# Patient Record
Sex: MALE | Race: WHITE | NOT HISPANIC OR LATINO | Employment: OTHER | URBAN - METROPOLITAN AREA
[De-identification: names, ages, dates, MRNs, and addresses within clinical notes are randomized per-mention and may not be internally consistent; named-entity substitution may affect disease eponyms.]

---

## 2018-01-29 ENCOUNTER — TRANSFERRED RECORDS (OUTPATIENT)
Dept: HEALTH INFORMATION MANAGEMENT | Facility: CLINIC | Age: 53
End: 2018-01-29

## 2018-02-05 ENCOUNTER — TRANSFERRED RECORDS (OUTPATIENT)
Dept: HEALTH INFORMATION MANAGEMENT | Facility: CLINIC | Age: 53
End: 2018-02-05

## 2019-02-14 ENCOUNTER — TRANSFERRED RECORDS (OUTPATIENT)
Dept: HEALTH INFORMATION MANAGEMENT | Facility: CLINIC | Age: 54
End: 2019-02-14

## 2019-02-14 LAB
ALBUMIN SERPL-MCNC: ABNORMAL G/DL
ALP SERPL-CCNC: 59 U/L (ref 30–120)
ALT SERPL-CCNC: 62 U/L (ref 0–45)
ANION GAP SERPL CALCULATED.3IONS-SCNC: ABNORMAL MMOL/L
AST SERPL-CCNC: 35 U/L (ref 0–41)
BILIRUB SERPL-MCNC: ABNORMAL MG/DL
BUN SERPL-MCNC: 19 MG/DL (ref 6–25)
CALCIUM SERPL-MCNC: ABNORMAL MG/DL
CHLORIDE SERPLBLD-SCNC: ABNORMAL MMOL/L
CHOLEST SERPL-MCNC: 261 MG/DL (ref 120–240)
CO2 SERPL-SCNC: ABNORMAL MMOL/L
CREAT SERPL-MCNC: 0.85 MG/DL (ref 0.6–1.5)
GFR SERPL CREATININE-BSD FRML MDRD: ABNORMAL ML/MIN/1.73M2
GLUCOSE SERPL-MCNC: 85 MG/DL (ref 70–110)
HDLC SERPL-MCNC: 48.9 MG/DL
LDLC SERPL CALC-MCNC: 177 MG/DL
NONHDLC SERPL-MCNC: ABNORMAL MG/DL
POTASSIUM SERPL-SCNC: ABNORMAL MMOL/L
PROT SERPL-MCNC: ABNORMAL G/DL
SODIUM SERPL-SCNC: ABNORMAL MMOL/L
TRIGL SERPL-MCNC: 173 MG/DL (ref 10–200)

## 2019-04-25 ENCOUNTER — PRE VISIT (OUTPATIENT)
Dept: CARDIOLOGY | Facility: CLINIC | Age: 54
End: 2019-04-25

## 2019-04-25 NOTE — TELEPHONE ENCOUNTER
"Per [facility] and Care Everywhere, has not been seen other than by orthopedics since 12/2016.   Voice message left for patient to call back, to let us know if he has been seen by any other providers.    Patient called right back. Has not seen any providers since 2016. Stopped taking the Atorvastatin \"a while ago\". Just did not like it. Was told there might be a different drug, but never followed up.  Denies any cardiac complaints. Need for life insurance.   When asked why no follow up, stated last MD told me I was \"good to go\", thought that ment I did not have to return.   Encouraged to come to appointment fasting, so labs can be done once seen by Dr. Burnett. Stress the importance of follow up and medication compliance post stenting.   Verbalzied understanding.  "

## 2019-04-30 ENCOUNTER — TELEPHONE (OUTPATIENT)
Dept: CARDIOLOGY | Facility: CLINIC | Age: 54
End: 2019-04-30

## 2019-04-30 NOTE — TELEPHONE ENCOUNTER
Needing Tax ID for clinic for insurance. Wanting to make sure it is covered by his insurance policy at [facility].   Will cancel appointment if not covered by insurance.

## 2019-05-01 ENCOUNTER — OFFICE VISIT (OUTPATIENT)
Dept: CARDIOLOGY | Facility: CLINIC | Age: 54
End: 2019-05-01
Payer: COMMERCIAL

## 2019-05-01 ENCOUNTER — DOCUMENTATION ONLY (OUTPATIENT)
Dept: CARDIOLOGY | Facility: CLINIC | Age: 54
End: 2019-05-01

## 2019-05-01 ENCOUNTER — HOSPITAL ENCOUNTER (OUTPATIENT)
Dept: CARDIOLOGY | Facility: CLINIC | Age: 54
Discharge: HOME | End: 2019-05-01
Attending: INTERNAL MEDICINE | Admitting: INTERNAL MEDICINE
Payer: COMMERCIAL

## 2019-05-01 ENCOUNTER — TELEPHONE (OUTPATIENT)
Dept: CARDIOLOGY | Facility: CLINIC | Age: 54
End: 2019-05-01

## 2019-05-01 VITALS
BODY MASS INDEX: 33.64 KG/M2 | HEIGHT: 69 IN | SYSTOLIC BLOOD PRESSURE: 101 MMHG | WEIGHT: 227.1 LBS | HEART RATE: 56 BPM | DIASTOLIC BLOOD PRESSURE: 68 MMHG

## 2019-05-01 DIAGNOSIS — I25.10 CORONARY ARTERY DISEASE INVOLVING NATIVE CORONARY ARTERY OF NATIVE HEART WITHOUT ANGINA PECTORIS: ICD-10-CM

## 2019-05-01 DIAGNOSIS — E78.5 HYPERLIPIDEMIA LDL GOAL <70: Primary | ICD-10-CM

## 2019-05-01 DIAGNOSIS — E78.5 HYPERLIPIDEMIA LDL GOAL <70: ICD-10-CM

## 2019-05-01 DIAGNOSIS — I25.10 CAD (CORONARY ARTERY DISEASE): Primary | ICD-10-CM

## 2019-05-01 PROCEDURE — 93000 ELECTROCARDIOGRAM COMPLETE: CPT | Mod: 59 | Performed by: INTERNAL MEDICINE

## 2019-05-01 PROCEDURE — 99213 OFFICE O/P EST LOW 20 MIN: CPT | Mod: 25 | Performed by: INTERNAL MEDICINE

## 2019-05-01 PROCEDURE — 40000264 ECHOCARDIOGRAM COMPLETE

## 2019-05-01 PROCEDURE — 25500064 ZZH RX 255 OP 636: Performed by: INTERNAL MEDICINE

## 2019-05-01 PROCEDURE — 93306 TTE W/DOPPLER COMPLETE: CPT | Mod: 26 | Performed by: INTERNAL MEDICINE

## 2019-05-01 RX ORDER — ATORVASTATIN CALCIUM 40 MG/1
40 TABLET, FILM COATED ORAL DAILY
Qty: 90 TABLET | Refills: 3 | Status: SHIPPED | OUTPATIENT
Start: 2019-05-01 | End: 2019-08-08

## 2019-05-01 RX ORDER — ATORVASTATIN CALCIUM 10 MG/1
40 TABLET, FILM COATED ORAL DAILY
Qty: 90 TABLET | Refills: 3 | Status: SHIPPED | OUTPATIENT
Start: 2019-05-01 | End: 2019-05-01 | Stop reason: ALTCHOICE

## 2019-05-01 RX ADMIN — HUMAN ALBUMIN MICROSPHERES AND PERFLUTREN 6 ML: 10; .22 INJECTION, SOLUTION INTRAVENOUS at 13:30

## 2019-05-01 ASSESSMENT — MIFFLIN-ST. JEOR: SCORE: 1855.5

## 2019-05-01 NOTE — TELEPHONE ENCOUNTER
Interpretation Summary Echo results      1. Normal left ventricular size and systolic function. LVEF 55-60%.  2. No regional wall motion abnormalities.  3. Normal right ventricular size and systolic function.  4. No hemodynamically significant valve disease.     Compared to the prior study dated 6/24/2015, there have been no changes.  __________________________________________________________________________

## 2019-05-01 NOTE — TELEPHONE ENCOUNTER
Dr. Burnett reviewed your labs that were done 2-14-19  He recommends you continue the lipitor 40mg and follow up with fasting labs in 8 weeks.  Review of your echocardiogram no change from previous echo.    Orders placed for FLP and alt/ast

## 2019-05-01 NOTE — PROGRESS NOTES
HPI and Plan:   Today I had the pleasure of seeing Ernesto Soliman at [facility] in [place]. He is a pleasant 53 year old patient with a past medical history of coronary artery disease status post stenting of mid LAD with 2 REMBERTO in June 2015 with with good angiographic results.  The first diagonal and first and second septal perforators were jailed and there was a smooth 35% stenosis in the distal LAD which was not intervened on. He also has hyperlipidemia and presents to the clinic for a follow-up visit after being lost to follow-up.     The patient stopping his medications 2 years ago.  He felt he was cured from coronary disease standpoint and did not need medications.  However recently he needed clearance for health insurance purposes and was told to establish care with a cardiologist.      Clinically, he has been doing well since repeated he was able to climb 3 flights of stairs today and was able to walk to the cafeteria and back from the clinic without any difficulty.  Prior to his intervention he was on the mailbox which is less than 25 m.  He has not had any echocardiograms recently.  However, the resting phase of the stress echocardiogram done prior to the intervention in 2015 showed a normal ejection fraction of 55 to 60%.  LDL was 56 in March 2016 while he was on Lipitor.  He has been off Lipitor since then.    Assessment and plan  1.  Coronary artery disease status post PCI of mid LAD and jailed D1 and first and second perforators  2.  Hyperlipidemia currently off medications    Discussion  The patient has history of coronary disease and is status post PCI.  I discussed with him the need to resume his medications.  He is currently only taking baby aspirin daily.  He is reluctant to start full dose Lipitor and he agreed to start moderate dose of Lipitor.  I will order 40 mg of Lipitor today.  I would also like to start him on a beta-blocker.  However, both, his blood pressure and  heart rate are on the lower side.  I then will hold off on starting beta-blocker today.  I will reevaluate him at next visit and reconsider starting beta-blocker.  His last cholesterol was checked while he was on Lipitor, which he has not taken for the last 2 years.  He recently had blood work done and I asked him to bring a copy for our records.  I will recheck fasting lipid profile 3 to 4 months after starting Lipitor.    Plan  1.  Start Lipitor 40 mg p.o. daily  2.  Please bring the results of your recent blood work  3.  Echocardiogram prior to next visit  4.  Heart healthy diet  5.  Regular exercise  6.  Do not stop medications without consulting us    Thank you for allowing me to participate in the care of Ernesto GINA Guerraon    Bean Burnett MD  Cardiology    Orders Placed This Encounter   Procedures     Follow-Up with Cardiologist     EKG 12-lead complete w/read - Clinics (performed today)     Echocardiogram Complete       Orders Placed This Encounter   Medications     atorvastatin (LIPITOR) 10 MG tablet     Sig: Take 4 tablets (40 mg) by mouth daily     Dispense:  90 tablet     Refill:  3       Medications Discontinued During This Encounter   Medication Reason     aspirin 81 MG tablet Stopped by Patient     atorvastatin (LIPITOR) 80 MG tablet Stopped by Patient     nitroglycerin (NITROSTAT) 0.4 MG SL tablet Stopped by Patient     Multiple Vitamin (MULTIVITAMIN OR) Stopped by Patient       Encounter Diagnoses   Name Primary?     Hyperlipidemia LDL goal <70 Yes     Coronary artery disease involving native coronary artery of native heart without angina pectoris        CURRENT MEDICATIONS:  Current Outpatient Medications   Medication Sig Dispense Refill     atorvastatin (LIPITOR) 10 MG tablet Take 4 tablets (40 mg) by mouth daily 90 tablet 3     amphetamine-dextroamphetamine (ADDERALL) 10 MG per tablet Take 1 tablet (10 mg) by mouth 2 times daily (Patient not taking: Reported on 5/1/2019) 60 tablet 0      amphetamine-dextroamphetamine (ADDERALL) 15 MG tablet Take 1 tablet (15 mg) by mouth 2 times daily (Patient not taking: Reported on 5/1/2019) 60 tablet 0       ALLERGIES   No Known Allergies    PAST MEDICAL HISTORY:  Past Medical History:   Diagnosis Date     ADD (attention deficit hyperactivity disorder, inattentive type) 1997     CAD (coronary artery disease) 6/15    REMBERTO x 2 = LAD  EF 35-40% - Dr Roy-Geovanny     Colon polyp 4/15    tubular adenoma - due 5 yrs     Controlled substance agreement signed 8/16     Family history of coronary artery disease      Hyperlipidemia LDL goal <70      Medication management        PAST SURGICAL HISTORY:  Past Surgical History:   Procedure Laterality Date     COLONOSCOPY  4/15    polyp x 1 6 mm - rectum - tubular adenoma - due 5 yrs     HEART CATH STENT COR W/WO PTCA  06/24/2015    REMBERTO x2 mid LAD, jailed diagonal     STRESS ECHO ([facility]RO)  6/15    abnormal - followed by abnormal angiogram - LAD occlusion     VASECTOMY  11/11    dr perez       FAMILY HISTORY:  Family History   Problem Relation Age of Onset     C.A.D. Maternal Uncle         stents x 3     C.A.D. Paternal Uncle         MI at 56     C.A.D. Maternal Grandfather         age 56     C.A.D. Paternal Grandfather         age 60       SOCIAL HISTORY:  Social History     Socioeconomic History     Marital status:      Spouse name: Nayely     Number of children: 3     Years of education: 16     Highest education level: None   Occupational History     Employer: [employer]   Social Needs     Financial resource strain: None     Food insecurity:     Worry: None     Inability: None     Transportation needs:     Medical: None     Non-medical: None   Tobacco Use     Smoking status: Never Smoker     Smokeless tobacco: Never Used   Substance and Sexual Activity     Alcohol use: Yes     Alcohol/week: 0.6 - 1.2 oz     Types: 1 - 2 Standard drinks or equivalent per week     Comment: 3-6 glasses of wine per month     Drug use: No  "    Sexual activity: Yes     Partners: Female   Lifestyle     Physical activity:     Days per week: None     Minutes per session: None     Stress: None   Relationships     Social connections:     Talks on phone: None     Gets together: None     Attends Scientologist service: None     Active member of club or organization: None     Attends meetings of clubs or organizations: None     Relationship status: None     Intimate partner violence:     Fear of current or ex partner: None     Emotionally abused: None     Physically abused: None     Forced sexual activity: None   Other Topics Concern      Service Not Asked     Blood Transfusions Not Asked     Caffeine Concern No     Comment: 2 cups daily, occas pop     Occupational Exposure Not Asked     Hobby Hazards Not Asked     Sleep Concern Not Asked     Stress Concern Not Asked     Weight Concern Not Asked     Special Diet Not Asked     Back Care Not Asked     Exercise Yes     Comment: 1-3/wk     Bike Helmet Not Asked     Seat Belt Yes     Self-Exams Not Asked     Parent/sibling w/ CABG, MI or angioplasty before 65F 55M? No   Social History Narrative     None       Review of Systems:  Skin:  Negative       Eyes:  Negative      ENT:  Negative      Respiratory:  Negative       Cardiovascular:  Negative      Gastroenterology: Negative      Genitourinary:  not assessed      Musculoskeletal:  Negative      Neurologic:  Negative      Psychiatric:  Negative      Heme/Lymph/Imm:  Negative      Endocrine:  Negative        Physical Exam:  Vitals: /68   Pulse 56   Ht 1.753 m (5' 9\")   Wt 103 kg (227 lb 1.6 oz)   BMI 33.54 kg/m    Constitutional: awake, alert, no distress  Skin: Warm and dry to touch  Head: Normocephalic, atraumatic  Eyes: Conjunctivae and lids unremarkable, sclera white  ENT: No pallor or cyanosis  Neck: Carotid pulses are full and equal bilaterally.  Respiratory: Normal breath sounds, clear to auscultation, no use of sensory muscles, no wheezing or " crepts  Cardiac: Regular rate and rhythm, S1-S2 normal.  No murmurs gallops or rubs.  Pulses full and equal bilaterally in all 4 extremities.  No pedal edema.   Abdomen: soft and nontender.  Extremities and musculoskeletal: No gross motor deficit  Neurological.  Affect normal  Psych: Alert and oriented x3    Recent Lab Results:  LIPID RESULTS:  Lab Results   Component Value Date    CHOL 155 03/02/2016    HDL 55 03/02/2016    LDL 56 03/02/2016    TRIG 218 (H) 03/02/2016    CHOLHDLRATIO 3.6 07/22/2015       LIVER ENZYME RESULTS:  Lab Results   Component Value Date    AST 23 03/02/2016    ALT 56 03/02/2016       CBC RESULTS:  Lab Results   Component Value Date    WBC 6.9 03/02/2016    RBC 5.22 03/02/2016    HGB 15.7 03/02/2016    HCT 45.4 03/02/2016    MCV 87 03/02/2016    MCH 30.1 03/02/2016    MCHC 34.6 03/02/2016    RDW 13.5 03/02/2016     03/02/2016       BMP RESULTS:  Lab Results   Component Value Date     03/02/2016    POTASSIUM 4.4 03/02/2016    CHLORIDE 106 03/02/2016    CO2 26 03/02/2016    ANIONGAP 6 03/02/2016    GLC 84 03/02/2016    BUN 17 03/02/2016    CR 0.86 03/02/2016    GFRESTIMATED >90  Non  GFR Calc   03/02/2016    GFRESTBLACK >90   GFR Calc   03/02/2016    ALYSON 8.5 03/02/2016        A1C RESULTS:  Lab Results   Component Value Date    A1C 5.2 03/02/2016       INR RESULTS:  Lab Results   Component Value Date    INR 0.97 06/24/2015       CC  Andrews Whiteside MD  [address] [place] [address]    All medical records were reviewed in detail and discussed with the patient. Greater than 30 mins were spent with the patient, 50% of this time was spent on counseling and coordination of care.  After visit summary was printed and given to the patient.

## 2019-05-01 NOTE — LETTER
5/1/2019    Andrews Whiteside MD  [address] [facility] [address]    RE: Ernesto R Jourdan       Dear Colleague,    I had the pleasure of seeing Ernesto Soliman in the [facility].    HPI and Plan:   Today I had the pleasure of seeing Ernesto Soliman at [facility] in [place]. He is a pleasant 53 year old patient with a past medical history of coronary artery disease status post stenting of mid LAD with 2 REMBERTO in June 2015 with with good angiographic results.  The first diagonal and first and second septal perforators were jailed and there was a smooth 35% stenosis in the distal LAD which was not intervened on. He also has hyperlipidemia and presents to the clinic for a follow-up visit after being lost to follow-up.     The patient stopping his medications 2 years ago.  He felt he was cured from coronary disease standpoint and did not need medications.  However recently he needed clearance for health insurance purposes and was told to establish care with a cardiologist.      Clinically, he has been doing well since repeated he was able to climb 3 flights of stairs today and was able to walk to the cafeteria and back from the clinic without any difficulty.  Prior to his intervention he was on the mailbox which is less than 25 m.  He has not had any echocardiograms recently.  However, the resting phase of the stress echocardiogram done prior to the intervention in 2015 showed a normal ejection fraction of 55 to 60%.  LDL was 56 in March 2016 while he was on Lipitor.  He has been off Lipitor since then.    Assessment and plan  1.  Coronary artery disease status post PCI of mid LAD and jailed D1 and first and second perforators  2.  Hyperlipidemia currently off medications    Discussion  The patient has history of coronary disease and is status post PCI.  I discussed with him the need to resume his medications.  He is currently only taking baby aspirin daily.  He  is reluctant to start full dose Lipitor and he agreed to start moderate dose of Lipitor.  I will order 40 mg of Lipitor today.  I would also like to start him on a beta-blocker.  However, both, his blood pressure and heart rate are on the lower side.  I then will hold off on starting beta-blocker today.  I will reevaluate him at next visit and reconsider starting beta-blocker.  His last cholesterol was checked while he was on Lipitor, which he has not taken for the last 2 years.  He recently had blood work done and I asked him to bring a copy for our records.  I will recheck fasting lipid profile 3 to 4 months after starting Lipitor.    Plan  1.  Start Lipitor 40 mg p.o. daily  2.  Please bring the results of your recent blood work  3.  Echocardiogram prior to next visit  4.  Heart healthy diet  5.  Regular exercise  6.  Do not stop medications without consulting us    Thank you for allowing me to participate in the care of Ernesto GINA Guerraon    Bean Burnett MD  Cardiology    Orders Placed This Encounter   Procedures     Follow-Up with Cardiologist     EKG 12-lead complete w/read - Clinics (performed today)     Echocardiogram Complete       Orders Placed This Encounter   Medications     atorvastatin (LIPITOR) 10 MG tablet     Sig: Take 4 tablets (40 mg) by mouth daily     Dispense:  90 tablet     Refill:  3       Medications Discontinued During This Encounter   Medication Reason     aspirin 81 MG tablet Stopped by Patient     atorvastatin (LIPITOR) 80 MG tablet Stopped by Patient     nitroglycerin (NITROSTAT) 0.4 MG SL tablet Stopped by Patient     Multiple Vitamin (MULTIVITAMIN OR) Stopped by Patient       Encounter Diagnoses   Name Primary?     Hyperlipidemia LDL goal <70 Yes     Coronary artery disease involving native coronary artery of native heart without angina pectoris        CURRENT MEDICATIONS:  Current Outpatient Medications   Medication Sig Dispense Refill     atorvastatin (LIPITOR) 10 MG tablet Take 4  tablets (40 mg) by mouth daily 90 tablet 3     amphetamine-dextroamphetamine (ADDERALL) 10 MG per tablet Take 1 tablet (10 mg) by mouth 2 times daily (Patient not taking: Reported on 5/1/2019) 60 tablet 0     amphetamine-dextroamphetamine (ADDERALL) 15 MG tablet Take 1 tablet (15 mg) by mouth 2 times daily (Patient not taking: Reported on 5/1/2019) 60 tablet 0       ALLERGIES   No Known Allergies    PAST MEDICAL HISTORY:  Past Medical History:   Diagnosis Date     ADD (attention deficit hyperactivity disorder, inattentive type) 1997     CAD (coronary artery disease) 6/15    REMBERTO x 2 = LAD  EF 35-40% - Dr Roy-Geovanny     Colon polyp 4/15    tubular adenoma - due 5 yrs     Controlled substance agreement signed 8/16     Family history of coronary artery disease      Hyperlipidemia LDL goal <70      Medication management        PAST SURGICAL HISTORY:  Past Surgical History:   Procedure Laterality Date     COLONOSCOPY  4/15    polyp x 1 6 mm - rectum - tubular adenoma - due 5 yrs     HEART CATH STENT COR W/WO PTCA  06/24/2015    REMBERTO x2 mid LAD, jailed diagonal     STRESS ECHO ([facility])  6/15    abnormal - followed by abnormal angiogram - LAD occlusion     VASECTOMY  11/11    dr perez       FAMILY HISTORY:  Family History   Problem Relation Age of Onset     C.A.D. Maternal Uncle         stents x 3     C.A.D. Paternal Uncle         MI at 56     C.A.D. Maternal Grandfather         age 56     C.A.D. Paternal Grandfather         age 60       SOCIAL HISTORY:  Social History     Socioeconomic History     Marital status:      Spouse name: Nayely     Number of children: 3     Years of education: 16     Highest education level: None   Occupational History     Employer: [employer]   Social Needs     Financial resource strain: None     Food insecurity:     Worry: None     Inability: None     Transportation needs:     Medical: None     Non-medical: None   Tobacco Use     Smoking status: Never Smoker     Smokeless tobacco:  "Never Used   Substance and Sexual Activity     Alcohol use: Yes     Alcohol/week: 0.6 - 1.2 oz     Types: 1 - 2 Standard drinks or equivalent per week     Comment: 3-6 glasses of wine per month     Drug use: No     Sexual activity: Yes     Partners: Female   Lifestyle     Physical activity:     Days per week: None     Minutes per session: None     Stress: None   Relationships     Social connections:     Talks on phone: None     Gets together: None     Attends Yazidi service: None     Active member of club or organization: None     Attends meetings of clubs or organizations: None     Relationship status: None     Intimate partner violence:     Fear of current or ex partner: None     Emotionally abused: None     Physically abused: None     Forced sexual activity: None   Other Topics Concern      Service Not Asked     Blood Transfusions Not Asked     Caffeine Concern No     Comment: 2 cups daily, occas pop     Occupational Exposure Not Asked     Hobby Hazards Not Asked     Sleep Concern Not Asked     Stress Concern Not Asked     Weight Concern Not Asked     Special Diet Not Asked     Back Care Not Asked     Exercise Yes     Comment: 1-3/wk     Bike Helmet Not Asked     Seat Belt Yes     Self-Exams Not Asked     Parent/sibling w/ CABG, MI or angioplasty before 65F 55M? No   Social History Narrative     None       Review of Systems:  Skin:  Negative       Eyes:  Negative      ENT:  Negative      Respiratory:  Negative       Cardiovascular:  Negative      Gastroenterology: Negative      Genitourinary:  not assessed      Musculoskeletal:  Negative      Neurologic:  Negative      Psychiatric:  Negative      Heme/Lymph/Imm:  Negative      Endocrine:  Negative        Physical Exam:  Vitals: /68   Pulse 56   Ht 1.753 m (5' 9\")   Wt 103 kg (227 lb 1.6 oz)   BMI 33.54 kg/m     Constitutional: awake, alert, no distress  Skin: Warm and dry to touch  Head: Normocephalic, atraumatic  Eyes: Conjunctivae and lids " unremarkable, sclera white  ENT: No pallor or cyanosis  Neck: Carotid pulses are full and equal bilaterally.  Respiratory: Normal breath sounds, clear to auscultation, no use of sensory muscles, no wheezing or crepts  Cardiac: Regular rate and rhythm, S1-S2 normal.  No murmurs gallops or rubs.  Pulses full and equal bilaterally in all 4 extremities.  No pedal edema.   Abdomen: soft and nontender.  Extremities and musculoskeletal: No gross motor deficit  Neurological.  Affect normal  Psych: Alert and oriented x3    Recent Lab Results:  LIPID RESULTS:  Lab Results   Component Value Date    CHOL 155 03/02/2016    HDL 55 03/02/2016    LDL 56 03/02/2016    TRIG 218 (H) 03/02/2016    CHOLHDLRATIO 3.6 07/22/2015       LIVER ENZYME RESULTS:  Lab Results   Component Value Date    AST 23 03/02/2016    ALT 56 03/02/2016       CBC RESULTS:  Lab Results   Component Value Date    WBC 6.9 03/02/2016    RBC 5.22 03/02/2016    HGB 15.7 03/02/2016    HCT 45.4 03/02/2016    MCV 87 03/02/2016    MCH 30.1 03/02/2016    MCHC 34.6 03/02/2016    RDW 13.5 03/02/2016     03/02/2016       BMP RESULTS:  Lab Results   Component Value Date     03/02/2016    POTASSIUM 4.4 03/02/2016    CHLORIDE 106 03/02/2016    CO2 26 03/02/2016    ANIONGAP 6 03/02/2016    GLC 84 03/02/2016    BUN 17 03/02/2016    CR 0.86 03/02/2016    GFRESTIMATED >90  Non  GFR Calc   03/02/2016    GFRESTBLACK >90   GFR Calc   03/02/2016    ALYSON 8.5 03/02/2016        A1C RESULTS:  Lab Results   Component Value Date    A1C 5.2 03/02/2016       INR RESULTS:  Lab Results   Component Value Date    INR 0.97 06/24/2015       CC  Andrews Whiteside MD  [address] [place] [id number]    All medical records were reviewed in detail and discussed with the patient. Greater than 30 mins were spent with the patient, 50% of this time was spent on counseling and coordination of care.  After visit summary was printed and given to the  patient.      Thank you for allowing me to participate in the care of your patient.    Sincerely,     Bean Burnett MD     [facility]

## 2019-05-01 NOTE — LETTER
5/1/2019    Andrews Whiteside MD  [address] [facility] [address]    RE: Ernesto R Jourdan       Dear Colleague,    I had the pleasure of seeing Ernesto Soliman in the [facility].    HPI and Plan:   Today I had the pleasure of seeing Ernesto Soliman at [facility] in [place]. He is a pleasant 53 year old patient with a past medical history of coronary artery disease status post stenting of mid LAD with 2 REMBERTO in June 2015 with with good angiographic results.  The first diagonal and first and second septal perforators were jailed and there was a smooth 35% stenosis in the distal LAD which was not intervened on. He also has hyperlipidemia and presents to the clinic for a follow-up visit after being lost to follow-up.     The patient stopping his medications 2 years ago.  He felt he was cured from coronary disease standpoint and did not need medications.  However recently he needed clearance for health insurance purposes and was told to establish care with a cardiologist.      Clinically, he has been doing well since repeated he was able to climb 3 flights of stairs today and was able to walk to the cafeteria and back from the clinic without any difficulty.  Prior to his intervention he was on the mailbox which is less than 25 m.  He has not had any echocardiograms recently.  However, the resting phase of the stress echocardiogram done prior to the intervention in 2015 showed a normal ejection fraction of 55 to 60%.  LDL was 56 in March 2016 while he was on Lipitor.  He has been off Lipitor since then.    Assessment and plan  1.  Coronary artery disease status post PCI of mid LAD and jailed D1 and first and second perforators  2.  Hyperlipidemia currently off medications    Discussion  The patient has history of coronary disease and is status post PCI.  I discussed with him the need to resume his medications.  He is currently only taking baby aspirin daily.  He  is reluctant to start full dose Lipitor and he agreed to start moderate dose of Lipitor.  I will order 40 mg of Lipitor today.  I would also like to start him on a beta-blocker.  However, both, his blood pressure and heart rate are on the lower side.  I then will hold off on starting beta-blocker today.  I will reevaluate him at next visit and reconsider starting beta-blocker.  His last cholesterol was checked while he was on Lipitor, which he has not taken for the last 2 years.  He recently had blood work done and I asked him to bring a copy for our records.  I will recheck fasting lipid profile 3 to 4 months after starting Lipitor.    Plan  1.  Start Lipitor 40 mg p.o. daily  2.  Please bring the results of your recent blood work  3.  Echocardiogram prior to next visit  4.  Heart healthy diet  5.  Regular exercise  6.  Do not stop medications without consulting us    Thank you for allowing me to participate in the care of Ernesto GINA Guerraon    Bean Burnett MD  Cardiology    Orders Placed This Encounter   Procedures     Follow-Up with Cardiologist     EKG 12-lead complete w/read - Clinics (performed today)     Echocardiogram Complete       Orders Placed This Encounter   Medications     atorvastatin (LIPITOR) 10 MG tablet     Sig: Take 4 tablets (40 mg) by mouth daily     Dispense:  90 tablet     Refill:  3       Medications Discontinued During This Encounter   Medication Reason     aspirin 81 MG tablet Stopped by Patient     atorvastatin (LIPITOR) 80 MG tablet Stopped by Patient     nitroglycerin (NITROSTAT) 0.4 MG SL tablet Stopped by Patient     Multiple Vitamin (MULTIVITAMIN OR) Stopped by Patient       Encounter Diagnoses   Name Primary?     Hyperlipidemia LDL goal <70 Yes     Coronary artery disease involving native coronary artery of native heart without angina pectoris        CURRENT MEDICATIONS:  Current Outpatient Medications   Medication Sig Dispense Refill     atorvastatin (LIPITOR) 10 MG tablet Take 4  tablets (40 mg) by mouth daily 90 tablet 3     amphetamine-dextroamphetamine (ADDERALL) 10 MG per tablet Take 1 tablet (10 mg) by mouth 2 times daily (Patient not taking: Reported on 5/1/2019) 60 tablet 0     amphetamine-dextroamphetamine (ADDERALL) 15 MG tablet Take 1 tablet (15 mg) by mouth 2 times daily (Patient not taking: Reported on 5/1/2019) 60 tablet 0       ALLERGIES   No Known Allergies    PAST MEDICAL HISTORY:  Past Medical History:   Diagnosis Date     ADD (attention deficit hyperactivity disorder, inattentive type) 1997     CAD (coronary artery disease) 6/15    REMBERTO x 2 = LAD  EF 35-40% - Dr Roy-Geovanny     Colon polyp 4/15    tubular adenoma - due 5 yrs     Controlled substance agreement signed 8/16     Family history of coronary artery disease      Hyperlipidemia LDL goal <70      Medication management        PAST SURGICAL HISTORY:  Past Surgical History:   Procedure Laterality Date     COLONOSCOPY  4/15    polyp x 1 6 mm - rectum - tubular adenoma - due 5 yrs     HEART CATH STENT COR W/WO PTCA  06/24/2015    REMBERTO x2 mid LAD, jailed diagonal     STRESS ECHO ([facility])  6/15    abnormal - followed by abnormal angiogram - LAD occlusion     VASECTOMY  11/11    dr perez       FAMILY HISTORY:  Family History   Problem Relation Age of Onset     C.A.D. Maternal Uncle         stents x 3     C.A.D. Paternal Uncle         MI at 56     C.A.D. Maternal Grandfather         age 56     C.A.D. Paternal Grandfather         age 60       SOCIAL HISTORY:  Social History     Socioeconomic History     Marital status:      Spouse name: Nayely     Number of children: 3     Years of education: 16     Highest education level: None   Occupational History     Employer: [employer]   Social Needs     Financial resource strain: None     Food insecurity:     Worry: None     Inability: None     Transportation needs:     Medical: None     Non-medical: None   Tobacco Use     Smoking status: Never Smoker     Smokeless tobacco:  "Never Used   Substance and Sexual Activity     Alcohol use: Yes     Alcohol/week: 0.6 - 1.2 oz     Types: 1 - 2 Standard drinks or equivalent per week     Comment: 3-6 glasses of wine per month     Drug use: No     Sexual activity: Yes     Partners: Female   Lifestyle     Physical activity:     Days per week: None     Minutes per session: None     Stress: None   Relationships     Social connections:     Talks on phone: None     Gets together: None     Attends Hoahaoism service: None     Active member of club or organization: None     Attends meetings of clubs or organizations: None     Relationship status: None     Intimate partner violence:     Fear of current or ex partner: None     Emotionally abused: None     Physically abused: None     Forced sexual activity: None   Other Topics Concern      Service Not Asked     Blood Transfusions Not Asked     Caffeine Concern No     Comment: 2 cups daily, occas pop     Occupational Exposure Not Asked     Hobby Hazards Not Asked     Sleep Concern Not Asked     Stress Concern Not Asked     Weight Concern Not Asked     Special Diet Not Asked     Back Care Not Asked     Exercise Yes     Comment: 1-3/wk     Bike Helmet Not Asked     Seat Belt Yes     Self-Exams Not Asked     Parent/sibling w/ CABG, MI or angioplasty before 65F 55M? No   Social History Narrative     None       Review of Systems:  Skin:  Negative       Eyes:  Negative      ENT:  Negative      Respiratory:  Negative       Cardiovascular:  Negative      Gastroenterology: Negative      Genitourinary:  not assessed      Musculoskeletal:  Negative      Neurologic:  Negative      Psychiatric:  Negative      Heme/Lymph/Imm:  Negative      Endocrine:  Negative        Physical Exam:  Vitals: /68   Pulse 56   Ht 1.753 m (5' 9\")   Wt 103 kg (227 lb 1.6 oz)   BMI 33.54 kg/m     Constitutional: awake, alert, no distress  Skin: Warm and dry to touch  Head: Normocephalic, atraumatic  Eyes: Conjunctivae and lids " unremarkable, sclera white  ENT: No pallor or cyanosis  Neck: Carotid pulses are full and equal bilaterally.  Respiratory: Normal breath sounds, clear to auscultation, no use of sensory muscles, no wheezing or crepts  Cardiac: Regular rate and rhythm, S1-S2 normal.  No murmurs gallops or rubs.  Pulses full and equal bilaterally in all 4 extremities.  No pedal edema.   Abdomen: soft and nontender.  Extremities and musculoskeletal: No gross motor deficit  Neurological.  Affect normal  Psych: Alert and oriented x3    Recent Lab Results:  LIPID RESULTS:  Lab Results   Component Value Date    CHOL 155 03/02/2016    HDL 55 03/02/2016    LDL 56 03/02/2016    TRIG 218 (H) 03/02/2016    CHOLHDLRATIO 3.6 07/22/2015       LIVER ENZYME RESULTS:  Lab Results   Component Value Date    AST 23 03/02/2016    ALT 56 03/02/2016       CBC RESULTS:  Lab Results   Component Value Date    WBC 6.9 03/02/2016    RBC 5.22 03/02/2016    HGB 15.7 03/02/2016    HCT 45.4 03/02/2016    MCV 87 03/02/2016    MCH 30.1 03/02/2016    MCHC 34.6 03/02/2016    RDW 13.5 03/02/2016     03/02/2016       BMP RESULTS:  Lab Results   Component Value Date     03/02/2016    POTASSIUM 4.4 03/02/2016    CHLORIDE 106 03/02/2016    CO2 26 03/02/2016    ANIONGAP 6 03/02/2016    GLC 84 03/02/2016    BUN 17 03/02/2016    CR 0.86 03/02/2016    GFRESTIMATED >90  Non  GFR Calc   03/02/2016    GFRESTBLACK >90   GFR Calc   03/02/2016    ALYSON 8.5 03/02/2016        A1C RESULTS:  Lab Results   Component Value Date    A1C 5.2 03/02/2016       INR RESULTS:  Lab Results   Component Value Date    INR 0.97 06/24/2015       CC  Andrews Whiteside MD  [address] [place] [id number]    All medical records were reviewed in detail and discussed with the patient. Greater than 30 mins were spent with the patient, 50% of this time was spent on counseling and coordination of care.  After visit summary was printed and given to the  patient.      Thank you for allowing me to participate in the care of your patient.      Sincerely,     Bean Burnett MD     [facility]    cc:   Andrews Whiteside MD  [address] [place] [address]

## 2019-05-06 NOTE — TELEPHONE ENCOUNTER
Left detailed message for patient letter for insurance co. Was signed by Dr. Burnett and letter was up at check in for him to .  Signed copy was sent to Team 3 drawer.

## 2019-05-14 ENCOUNTER — TELEPHONE (OUTPATIENT)
Dept: CARDIOLOGY | Facility: CLINIC | Age: 54
End: 2019-05-14

## 2019-05-14 NOTE — TELEPHONE ENCOUNTER
Received a call from Ernesto requesting a copy of the results of his most recent echocardiogram, EKG, and the note from his visit with Dr. Burnett per a request from his life insurance company for verification of the information in the letter they received from Dr. Burnett. Records were left in an envelope at the  and the patient planned to pick it up at the clinic tomorrow. CHRISTIANO Barry RN - 05/14/19, 3:35 PM

## 2019-08-08 ENCOUNTER — TELEPHONE (OUTPATIENT)
Dept: CARDIOLOGY | Facility: CLINIC | Age: 54
End: 2019-08-08

## 2019-08-08 DIAGNOSIS — I25.10 CAD (CORONARY ARTERY DISEASE): ICD-10-CM

## 2019-08-08 RX ORDER — ATORVASTATIN CALCIUM 40 MG/1
40 TABLET, FILM COATED ORAL DAILY
Qty: 90 TABLET | Refills: 2 | Status: SHIPPED | OUTPATIENT
Start: 2019-08-08 | End: 2020-05-11

## 2019-08-08 RX ORDER — ATORVASTATIN CALCIUM 40 MG/1
40 TABLET, FILM COATED ORAL DAILY
Qty: 90 TABLET | Refills: 2 | Status: SHIPPED | OUTPATIENT
Start: 2019-08-08 | End: 2019-08-08

## 2019-09-10 ENCOUNTER — TELEPHONE (OUTPATIENT)
Dept: FAMILY MEDICINE | Facility: CLINIC | Age: 54
End: 2019-09-10

## 2019-09-10 DIAGNOSIS — G47.9 SLEEP DISORDER: ICD-10-CM

## 2019-09-10 DIAGNOSIS — Z00.01 ENCOUNTER FOR ROUTINE ADULT MEDICAL EXAM WITH ABNORMAL FINDINGS: Primary | ICD-10-CM

## 2019-09-10 NOTE — TELEPHONE ENCOUNTER
Dr Whiteside- patient calling to get a referral to have a sleep study done for sleep apnea. Not sure if patient needs appt first. Will pend referral. Thanks    Love Harmon  Referral Coordinator

## 2019-09-11 NOTE — TELEPHONE ENCOUNTER
Patient last seen 9/2016, 3 yrs ago, advise due for cpx fasting, will do referral at that time, do not know current symptoms.

## 2019-09-11 NOTE — TELEPHONE ENCOUNTER
Pt contacted and advised of need for visit. Pt agreed and scheduled.   Next 5 appointments (look out 90 days)    Sep 23, 2019  9:20 AM CDT  SHORT with Andrews Whiteside MD  [facility] [address] [place] [id number]  [phone]        Fasting labs ordered    Seng Bee RN   [facility]

## 2019-09-11 NOTE — TELEPHONE ENCOUNTER
Called patient and left VM. Relayed message from Dr Whiteside that patient needs to have a physical first then will do the referral for sleep study.    Love Harmon  Referral Coordinator

## 2019-09-26 ENCOUNTER — TELEPHONE (OUTPATIENT)
Dept: FAMILY MEDICINE | Facility: CLINIC | Age: 54
End: 2019-09-26

## 2019-09-26 NOTE — TELEPHONE ENCOUNTER
Reason for Call:  Ernesto missed his physical appointment on Mon. 9/23/19. He said he didn't get the appt on his work calendar and he is very sorry he missed it.    He is requesting to be worked in if possible with Dr Whiteside in the next 2-3 weeks. He said he has to get a physical before Dr Whiteside will give him a referral for a sleep study and he would like to try to work that in before the end of the year.    Best phone number to reach pt at is: [phone]  Ok to leave a message with medical info? yes    Mary Wong  Patient Representative

## 2019-10-15 ENCOUNTER — OFFICE VISIT (OUTPATIENT)
Dept: FAMILY MEDICINE | Facility: CLINIC | Age: 54
End: 2019-10-15
Payer: COMMERCIAL

## 2019-10-15 VITALS
SYSTOLIC BLOOD PRESSURE: 128 MMHG | HEART RATE: 74 BPM | WEIGHT: 223 LBS | HEIGHT: 69 IN | TEMPERATURE: 97.2 F | OXYGEN SATURATION: 96 % | DIASTOLIC BLOOD PRESSURE: 82 MMHG | BODY MASS INDEX: 33.03 KG/M2

## 2019-10-15 DIAGNOSIS — K63.5 POLYP OF COLON, UNSPECIFIED PART OF COLON, UNSPECIFIED TYPE: ICD-10-CM

## 2019-10-15 DIAGNOSIS — Z00.00 ENCOUNTER FOR ROUTINE ADULT HEALTH EXAMINATION WITHOUT ABNORMAL FINDINGS: Primary | ICD-10-CM

## 2019-10-15 DIAGNOSIS — R06.83 SNORING: ICD-10-CM

## 2019-10-15 DIAGNOSIS — F90.0 ATTENTION DEFICIT HYPERACTIVITY DISORDER (ADHD), PREDOMINANTLY INATTENTIVE TYPE: ICD-10-CM

## 2019-10-15 DIAGNOSIS — I25.10 CORONARY ARTERY DISEASE INVOLVING NATIVE CORONARY ARTERY OF NATIVE HEART WITHOUT ANGINA PECTORIS: ICD-10-CM

## 2019-10-15 DIAGNOSIS — Z51.81 MEDICATION MONITORING ENCOUNTER: ICD-10-CM

## 2019-10-15 DIAGNOSIS — E78.5 HYPERLIPIDEMIA LDL GOAL <70: ICD-10-CM

## 2019-10-15 DIAGNOSIS — Z12.11 SCREEN FOR COLON CANCER: ICD-10-CM

## 2019-10-15 DIAGNOSIS — Z12.5 SCREENING FOR PROSTATE CANCER: ICD-10-CM

## 2019-10-15 DIAGNOSIS — Z82.49 FAMILY HISTORY OF CORONARY ARTERY DISEASE: ICD-10-CM

## 2019-10-15 LAB
ALBUMIN SERPL-MCNC: 4.1 G/DL (ref 3.4–5)
ALBUMIN UR-MCNC: NEGATIVE MG/DL
ALP SERPL-CCNC: 55 U/L (ref 40–150)
ALT SERPL W P-5'-P-CCNC: 62 U/L (ref 0–70)
ANION GAP SERPL CALCULATED.3IONS-SCNC: 9 MMOL/L (ref 3–14)
APPEARANCE UR: CLEAR
AST SERPL W P-5'-P-CCNC: 29 U/L (ref 0–45)
BILIRUB SERPL-MCNC: 0.6 MG/DL (ref 0.2–1.3)
BILIRUB UR QL STRIP: NEGATIVE
BUN SERPL-MCNC: 16 MG/DL (ref 7–30)
CALCIUM SERPL-MCNC: 8.5 MG/DL (ref 8.5–10.1)
CHLORIDE SERPL-SCNC: 105 MMOL/L (ref 94–109)
CHOLEST SERPL-MCNC: 127 MG/DL
CK SERPL-CCNC: 270 U/L (ref 30–300)
CO2 SERPL-SCNC: 24 MMOL/L (ref 20–32)
COLOR UR AUTO: YELLOW
CREAT SERPL-MCNC: 0.86 MG/DL (ref 0.66–1.25)
CREAT UR-MCNC: 149 MG/DL
ERYTHROCYTE [DISTWIDTH] IN BLOOD BY AUTOMATED COUNT: 12.9 % (ref 10–15)
GFR SERPL CREATININE-BSD FRML MDRD: >90 ML/MIN/{1.73_M2}
GLUCOSE SERPL-MCNC: 99 MG/DL (ref 70–99)
GLUCOSE UR STRIP-MCNC: NEGATIVE MG/DL
HCT VFR BLD AUTO: 44.4 % (ref 40–53)
HDLC SERPL-MCNC: 51 MG/DL
HGB BLD-MCNC: 15.3 G/DL (ref 13.3–17.7)
HGB UR QL STRIP: NEGATIVE
KETONES UR STRIP-MCNC: NEGATIVE MG/DL
LDLC SERPL CALC-MCNC: 54 MG/DL
LEUKOCYTE ESTERASE UR QL STRIP: NEGATIVE
MCH RBC QN AUTO: 29.8 PG (ref 26.5–33)
MCHC RBC AUTO-ENTMCNC: 34.5 G/DL (ref 31.5–36.5)
MCV RBC AUTO: 87 FL (ref 78–100)
MICROALBUMIN UR-MCNC: 6 MG/L
MICROALBUMIN/CREAT UR: 4 MG/G CR (ref 0–17)
NITRATE UR QL: NEGATIVE
NONHDLC SERPL-MCNC: 76 MG/DL
PH UR STRIP: 6 PH (ref 5–7)
PLATELET # BLD AUTO: 171 10E9/L (ref 150–450)
POTASSIUM SERPL-SCNC: 4.2 MMOL/L (ref 3.4–5.3)
PROT SERPL-MCNC: 7.1 G/DL (ref 6.8–8.8)
PSA SERPL-ACNC: 3.41 UG/L (ref 0–4)
RBC # BLD AUTO: 5.13 10E12/L (ref 4.4–5.9)
SODIUM SERPL-SCNC: 138 MMOL/L (ref 133–144)
SOURCE: NORMAL
SP GR UR STRIP: 1.02 (ref 1–1.03)
TRIGL SERPL-MCNC: 112 MG/DL
TSH SERPL DL<=0.005 MIU/L-ACNC: 1.68 MU/L (ref 0.4–4)
UROBILINOGEN UR STRIP-ACNC: 0.2 EU/DL (ref 0.2–1)
WBC # BLD AUTO: 6.4 10E9/L (ref 4–11)

## 2019-10-15 PROCEDURE — 82043 UR ALBUMIN QUANTITATIVE: CPT | Performed by: FAMILY MEDICINE

## 2019-10-15 PROCEDURE — 81003 URINALYSIS AUTO W/O SCOPE: CPT | Performed by: FAMILY MEDICINE

## 2019-10-15 PROCEDURE — 85027 COMPLETE CBC AUTOMATED: CPT | Performed by: FAMILY MEDICINE

## 2019-10-15 PROCEDURE — 82550 ASSAY OF CK (CPK): CPT | Performed by: FAMILY MEDICINE

## 2019-10-15 PROCEDURE — 84443 ASSAY THYROID STIM HORMONE: CPT | Performed by: FAMILY MEDICINE

## 2019-10-15 PROCEDURE — 80053 COMPREHEN METABOLIC PANEL: CPT | Performed by: FAMILY MEDICINE

## 2019-10-15 PROCEDURE — 99396 PREV VISIT EST AGE 40-64: CPT | Performed by: FAMILY MEDICINE

## 2019-10-15 PROCEDURE — 36415 COLL VENOUS BLD VENIPUNCTURE: CPT | Performed by: FAMILY MEDICINE

## 2019-10-15 PROCEDURE — 80061 LIPID PANEL: CPT | Performed by: FAMILY MEDICINE

## 2019-10-15 PROCEDURE — G0103 PSA SCREENING: HCPCS | Performed by: FAMILY MEDICINE

## 2019-10-15 ASSESSMENT — MIFFLIN-ST. JEOR: SCORE: 1831.9

## 2019-10-15 NOTE — PROGRESS NOTES
SUBJECTIVE:   CC: Ernesto Soliman is an 54 year old male who presents for preventive health visit.     Healthy Habits:    Do you get at least three servings of calcium containing foods daily (dairy, green leafy vegetables, etc.)? yes    Amount of exercise or daily activities, outside of work: good amount    Problems taking medications regularly No    Medication side effects: No    Have you had an eye exam in the past two years? yes    Do you see a dentist twice per year? yes    Do you have sleep apnea, excessive snoring or daytime drowsiness?yes    Wants sleep referral - snoring, increasing, apnea spells, tired in AM.    CAD - no cp - no sob - no edema - Dr Cheney/Lex SR x 2 - on atorvastatin -     Hyperlipidemia: Patient's hyperlipidemia is moderatley controlled. Patient is currently prescribed 40 mg Atorvastatin daily for hyperlipidemia management.    Recent Labs   Lab Test 02/14/19 03/02/16  0853 07/22/15  0742 06/25/15  0305   CHOL 261* 155 160 242*   HDL 48.9 55 44 54    56 79 142*   TRIG 173 218* 184* 232*   CHOLHDLRATIO  --   --  3.6 4.5     BP Readings from Last 3 Encounters:   10/15/19 128/82   05/01/19 101/68   09/21/16 118/78     Creatinine   Date Value Ref Range Status   02/14/2019 0.85 0.60 - 1.50 mg/dL Final     ADD - no meds - doing ok with out    Today's PHQ-2 Score:   PHQ-2 ( 1999 Pfizer) 9/21/2016 8/3/2016   Q1: Little interest or pleasure in doing things 0 0   Q2: Feeling down, depressed or hopeless 0 0   PHQ-2 Score 0 0       Abuse: Current or Past(Physical, Sexual or Emotional)- No  Do you feel safe in your environment? Yes    Social History     Tobacco Use     Smoking status: Never Smoker     Smokeless tobacco: Never Used   Substance Use Topics     Alcohol use: Yes     Alcohol/week: 1.0 - 2.0 standard drinks     Types: 1 - 2 Standard drinks or equivalent per week     Comment: 1-2 glasses of wine per month     If you drink alcohol do you typically have >3 drinks per day  or >7 drinks per week? No                      Last PSA:   PSA   Date Value Ref Range Status   03/02/2016 1.57 0 - 4 ug/L Final       Reviewed orders with patient. Reviewed health maintenance and updated orders accordingly - Yes    Reviewed and updated as needed this visit by clinical staff  Tobacco  Allergies  Meds  Med Hx  Surg Hx  Fam Hx  Soc Hx      Reviewed and updated as needed this visit by Provider  Allergies        Health Maintenance     Colonoscopy:  Due 4/2020   FIT:  given              PSA:  done   DEXA:  NA    Health Maintenance Due   Topic Date Due     HEPATITIS C SCREENING  1965     ADVANCE CARE PLANNING  1965     HIV SCREENING  09/01/1978     PNEUMOCOCCAL IMMUNIZATION 19-64 MEDIUM RISK (1 of 1 - PPSV23) 09/01/1982     ZOSTER IMMUNIZATION (1 of 2) 09/01/2013     PREVENTIVE CARE VISIT  03/02/2017     PSA  03/02/2017     FIT  11/20/2017     PHQ-2  01/01/2019     INFLUENZA VACCINE (1) 09/01/2019       Current Problem List    Patient Active Problem List   Diagnosis     ADD (attention deficit hyperactivity disorder, inattentive type)     Family history of coronary artery disease     Medication management     Colon polyp     CAD (coronary artery disease)     Hyperlipidemia LDL goal <70     Controlled substance agreement signed       Past Medical History    Past Medical History:   Diagnosis Date     ADD (attention deficit hyperactivity disorder, inattentive type) 1997     CAD (coronary artery disease) 6/15    REMBERTO x 2 = LAD  EF 35-40% - Dr Cheney     Colon polyp 4/15    tubular adenoma - due 5 yrs     Controlled substance agreement signed 8/16     Family history of coronary artery disease      Hyperlipidemia LDL goal <70      Medication management        Past Surgical History    Past Surgical History:   Procedure Laterality Date     COLONOSCOPY  4/15    polyp x 1 6 mm - rectum - tubular adenoma - due 5 yrs     HEART CATH STENT COR W/WO PTCA  06/24/2015    REMBERTO x2 mid LAD, jailed  diagonal     STRESS ECHO (METRO)  6/15    abnormal - followed by abnormal angiogram - LAD occlusion     VASECTOMY  11/11    dr perez       Current Medications    Current Outpatient Medications   Medication Sig Dispense Refill     aspirin (ASA) 81 MG tablet Take 1 tablet (81 mg) by mouth daily       atorvastatin (LIPITOR) 40 MG tablet Take 1 tablet (40 mg) by mouth daily 90 tablet 2       Allergies    No Known Allergies    Immunizations    Immunization History   Administered Date(s) Administered     TD (ADULT, 7+) 01/01/2003, 01/01/2008     TDAP Vaccine (Boostrix) 11/30/2012     Twinrix A/B 02/06/2008, 11/30/2012, 03/02/2016       Family History    Family History   Problem Relation Age of Onset     Chronic Obstructive Pulmonary Disease Mother         smoker     ALS Father         smoker     Cystic Fibrosis Son      C.A.D. Maternal Uncle         stents x 3     C.A.D. Paternal Uncle         MI at 56     C.A.D. Maternal Grandfather         age 56     C.A.D. Paternal Grandfather         age 60       Social History    Social History     Socioeconomic History     Marital status:      Spouse name: Nayely     Number of children: 3     Years of education: 16     Highest education level: Not on file   Occupational History     Employer: [employer]   Social Needs     Financial resource strain: Not on file     Food insecurity:     Worry: Not on file     Inability: Not on file     Transportation needs:     Medical: Not on file     Non-medical: Not on file   Tobacco Use     Smoking status: Never Smoker     Smokeless tobacco: Never Used   Substance and Sexual Activity     Alcohol use: Yes     Alcohol/week: 1.0 - 2.0 standard drinks     Types: 1 - 2 Standard drinks or equivalent per week     Comment: 1-2 glasses of wine per month     Drug use: No     Sexual activity: Yes     Partners: Female   Lifestyle     Physical activity:     Days per week: Not on file     Minutes per session: Not on file     Stress: Not on file    Relationships     Social connections:     Talks on phone: Not on file     Gets together: Not on file     Attends [employer] service: Not on file     Active member of club or organization: Not on file     Attends meetings of clubs or organizations: Not on file     Relationship status: Not on file     Intimate partner violence:     Fear of current or ex partner: Not on file     Emotionally abused: Not on file     Physically abused: Not on file     Forced sexual activity: Not on file   Other Topics Concern      Service Not Asked     Blood Transfusions Not Asked     Caffeine Concern No     Comment: 2 cups daily, occas pop     Occupational Exposure Not Asked     Hobby Hazards Not Asked     Sleep Concern Not Asked     Stress Concern Not Asked     Weight Concern Not Asked     Special Diet Not Asked     Back Care Not Asked     Exercise Yes     Comment: 1-3/wk     Bike Helmet Not Asked     Seat Belt Yes     Self-Exams Not Asked     Parent/sibling w/ CABG, MI or angioplasty before 65F 55M? No   Social History Narrative     Not on file         ROS:  CONSTITUTIONAL: NEGATIVE for fever, chills, change in weight  INTEGUMENTARY/SKIN: NEGATIVE for worrisome rashes, moles or lesions  EYES: NEGATIVE for vision changes or irritation  ENT: NEGATIVE for ear, mouth and throat problems  RESP: NEGATIVE for significant cough or SOB  CV: NEGATIVE for chest pain, palpitations or peripheral edema  GI: NEGATIVE for nausea, abdominal pain, heartburn, or change in bowel habits   male: negative for dysuria, hematuria, decreased urinary stream, erectile dysfunction, urethral discharge  MUSCULOSKELETAL: NEGATIVE for significant arthralgias or myalgia  NEURO: NEGATIVE for weakness, dizziness or paresthesias  ENDOCRINE: NEGATIVE for temperature intolerance, skin/hair changes  HEME/ALLERGY/IMMUNE: NEGATIVE for bleeding problems  PSYCHIATRIC: NEGATIVE for changes in mood or affect    OBJECTIVE:   /82   Pulse 74   Temp 97.2  F (36.2  " C) (Oral)   Ht 1.753 m (5' 9\")   Wt 101.2 kg (223 lb)   SpO2 96%   BMI 32.93 kg/m    EXAM:  GENERAL: healthy, alert and no distress  EYES: Eyes grossly normal to inspection  HENT:ear canals and TM's normal upon viewing with otoscope, nose and mouth without ulcers or lesions upon viewing with otoscope  NECK: no adenopathy, no asymmetry, masses, or scars and thyroid normal to palpation  RESP: lungs clear to auscultation - no rales, rhonchi or wheezes  CV: regular rate and rhythm, normal S1 S2, no S3 or S4, no murmur, click or rub, no peripheral edema and peripheral pulses strong  ABDOMEN: soft, nontender, no hepatosplenomegaly, no masses and bowel sounds normal   (male): normal male genitalia without lesions or urethral discharge, no hernia  RECTAL: normal sphincter tone, no rectal masses, prostate normal size, smooth, nontender without nodules or masses  MS: no gross musculoskeletal defects noted, no edema  SKIN: no suspicious lesions or rashes  NEURO: Normal strength and tone, mentation intact and speech normal  PSYCH: mentation appears normal, affect normal/bright  BACK: no CVA tenderness, no paralumbar tenderness    Diagnostic Test Results:    Pending    ASSESSMENT/PLAN:       ICD-10-CM    1. Encounter for routine adult health examination without abnormal findings Z00.00 Comprehensive metabolic panel     Lipid panel reflex to direct LDL Fasting     CK total     CBC with platelets     TSH with free T4 reflex     UA reflex to Microscopic and Culture     Albumin Random Urine Quantitative with Creat Ratio     Prostate spec antigen screen     Fecal colorectal cancer screen FIT   2. Coronary artery disease involving native coronary artery of native heart without angina pectoris I25.10 Comprehensive metabolic panel     Lipid panel reflex to direct LDL Fasting     UA reflex to Microscopic and Culture     Albumin Random Urine Quantitative with Creat Ratio     aspirin (ASA) 81 MG tablet   3. Family history of " coronary artery disease Z82.49 Comprehensive metabolic panel     Lipid panel reflex to direct LDL Fasting     UA reflex to Microscopic and Culture     Albumin Random Urine Quantitative with Creat Ratio   4. Hyperlipidemia LDL goal <70 E78.5 Comprehensive metabolic panel     Lipid panel reflex to direct LDL Fasting     CK total   5. Snoring R06.83 [place]AL[place] [facility] [phone] (Age 18 and up[place] (Dr Rodrigez)   6. ADD (attention deficit hyperactivity disorder, inattentive type) F90.0    7. Polyp of colon, unspecified part of colon, unspecified type K63.5 Fecal colorectal cancer screen FIT   8. Screening for prostate cancer Z12.5 Prostate spec antigen screen   9. Screen for colon cancer Z12.11 Fecal colorectal cancer screen FIT   10. Medication monitoring encounter Z51.81 Comprehensive metabolic panel     Lipid panel reflex to direct LDL Fasting     CK total     CBC with platelets     TSH with free T4 reflex     UA reflex to Microscopic and Culture     Albumin Random Urine Quantitative with Creat Ratio     Discussed treatment/modality options, including risk and benefits, he desires:    advised aspirin 81 mg po daily, advised 1 multivitamin per day, advised dentist every 6 months, advised diet and exercise and advised opthalmologist every 1-2 years    1) Patient consults with Dr. Cheney and Dr. Burnett of Cardiology for further CAD management.     2) Patient's hyperlipidemia is moderatley controlled. Patient is currently prescribed 40 mg Atorvastatin daily for hyperlipidemia management. Advised continued use.     3) Prescriptions refilled today.     4) Recommend flu shot. Recommend Shingrix vaccine.     5) Patient referred to sleep medicine today for further evaluation of sleep apnea.     6) Follow up in 1 year for complete physical exam.     All diagnosis above reviewed and noted above, otherwise stable.      See [employer] orders for further details.   "    Health Maintenance Due   Topic Date Due     HEPATITIS C SCREENING  1965     ADVANCE CARE PLANNING  1965     HIV SCREENING  09/01/1978     PNEUMOCOCCAL IMMUNIZATION 19-64 MEDIUM RISK (1 of 1 - PPSV23) 09/01/1982     ZOSTER IMMUNIZATION (1 of 2) 09/01/2013     PREVENTIVE CARE VISIT  03/02/2017     PSA  03/02/2017     FIT  11/20/2017     PHQ-2  01/01/2019     INFLUENZA VACCINE (1) 09/01/2019       COUNSELING:  Reviewed preventive health counseling, as reflected in patient instructions    Estimated body mass index is 32.93 kg/m  as calculated from the following:    Height as of this encounter: 1.753 m (5' 9\").    Weight as of this encounter: 101.2 kg (223 lb).    Weight management plan: Discussed healthy diet and exercise guidelines     reports that he has never smoked. He has never used smokeless tobacco.    Counseling Resources:  ATP IV Guidelines  Pooled Cohorts Equation Calculator  FRAX Risk Assessment  ICSI Preventive Guidelines  Dietary Guidelines for Americans, 2010  "[employer]'s MyPlate  ASA Prophylaxis  Lung CA Screening    This document serves as a record of the services and decisions personally performed and made by Andrews Whiteside MD. It was created on his behalf by Will Shukla, a trained medical scribe. The creation of this document is based on the provider's statements to the medical scribe.  Will Shukla October 15, 2019 8:16 AM     The information in this document, created by the medical scribe for me, accurately reflects the services I personally performed and the decisions made by me. I have reviewed and approved this document for accuracy prior to leaving the patient care area.  October 15, 2019          Andrews Whiteside MD, FAAFP    [address]  [address] [phone] Fax  "

## 2019-10-17 DIAGNOSIS — R97.20 ELEVATED PROSTATE SPECIFIC ANTIGEN (PSA): Primary | ICD-10-CM

## 2019-12-05 ENCOUNTER — OFFICE VISIT (OUTPATIENT)
Dept: SLEEP MEDICINE | Facility: CLINIC | Age: 54
End: 2019-12-05
Attending: FAMILY MEDICINE
Payer: COMMERCIAL

## 2019-12-05 VITALS
HEART RATE: 67 BPM | WEIGHT: 227 LBS | SYSTOLIC BLOOD PRESSURE: 116 MMHG | DIASTOLIC BLOOD PRESSURE: 68 MMHG | OXYGEN SATURATION: 96 % | BODY MASS INDEX: 33.62 KG/M2 | HEIGHT: 69 IN

## 2019-12-05 DIAGNOSIS — R06.83 SNORING: ICD-10-CM

## 2019-12-05 DIAGNOSIS — I25.10 CORONARY ARTERY DISEASE INVOLVING NATIVE CORONARY ARTERY OF NATIVE HEART WITHOUT ANGINA PECTORIS: ICD-10-CM

## 2019-12-05 DIAGNOSIS — G47.10 HYPERSOMNIA: Primary | ICD-10-CM

## 2019-12-05 DIAGNOSIS — E66.811 OBESITY (BMI 30.0-34.9): ICD-10-CM

## 2019-12-05 PROCEDURE — 99204 OFFICE O/P NEW MOD 45 MIN: CPT | Performed by: PEDIATRICS

## 2019-12-05 RX ORDER — UBIDECARENONE 100 MG
200 CAPSULE ORAL DAILY
COMMUNITY

## 2019-12-05 ASSESSMENT — MIFFLIN-ST. JEOR: SCORE: 1850.3

## 2019-12-05 NOTE — NURSING NOTE
"Chief Complaint   Patient presents with     Consult       Initial /68   Pulse 67   Ht 1.753 m (5' 9.02\")   Wt 103 kg (227 lb)   SpO2 96%   BMI 33.51 kg/m   Estimated body mass index is 33.51 kg/m  as calculated from the following:    Height as of this encounter: 1.753 m (5' 9.02\").    Weight as of this encounter: 103 kg (227 lb).    Medication Reconciliation: complete    Neck circumference: 16 inches / 41 centimeters.    DME:no    ESS 9  NAS 14  "

## 2019-12-05 NOTE — PROGRESS NOTES
[facility]  Outpatient Sleep Medicine Consultation      Name: Ernesto Soliman MRN# [id number]   Age: 54 year old YOB: 1965     Date of Consultation: December 5, 2019  Consultation is requested by: Andrews Whiteside MD  [address] [place] [address]  Primary care provider: Andrews Whiteside       Reason for Sleep Consult:     Ernesto Soliman is a 54 year old male who presents for evaluation of snoring.         Assessment and Plan:     1. Snoring  Patient is being evaluated for Obstructive Sleep Apnea (ARJUN).  Risk factors for ARJUN include:  snoring, witnessed apneas, excessive daytime sleepiness/subjective tiredness, obesity, age > 50, and male gender.  Recommend HST since patient is high risk for ARJUN without any relevant comorbid conditions.    Counseling included a comprehensive review of diagnostic and therapeutic strategies as well as risks of inadequate therapy.  He will follow up for the results on MyChart.  We dicussed the indications for AutoPAP therapy and/or oral appliance therapy.     2. Hypersomnia  The patient was strongly advised to avoid driving, operating any heavy machinery or engaging in other hazardous situations while drowsy or sleepy.  Patient was counseled on the importance of driving while alert and to pull over if drowsy.      3. Obesity (BMI 30.0-34.9)  Patients with obesity are at higher risk for sleep apnea due to fat deposition in the posterior airway and tongue.  Sleep disruption associated with untreated sleep disorders (including, but not limited to sleep apnea) can cause hormonal disruption that predisposes individuals to gain weight.  Weight loss can lead to improvement in sleep apnea severity.  Counseled regarding weight loss through diet modification and increased physical activity.     4. Coronary artery disease involving native coronary artery of native heart without angina pectoris  I discussed the negative and bidirectional  "interactions of untreated sleep apnea with heart conditions, including atrial fibrillation, congestive heart failure, and risk for myocardial infarction and stroke.    Risk of coronary artery disease is increased in middle-aged adults with untreated ARJUN.  Possible mechanisms include endothelial dysfunction, hypercoagulability, insulin resistance, increases in pro-inflammatory cytokines and adhesion molecules, oxidative stress, and increased sympathetic activity during sleep.          Chief Complaint:   \"Poor sleep, snoring\"         History of Present Illness:     Ernesto Soliman is a 54 year old male who I am seeing for the first time in my adult sleep medicine clinic.  His wife observes loud snoring which is disruptive to her own sleep.  She also has witnessed apneic events on at least one occasion.  He does not awaken feeling refreshed and consumes more than 3 caffeinated beverages per day as a coping mechanism for sleepiness.  His score on the [place] Sleepiness Scale is 9 out of 24 which likely underestimates his degree of sleepiness.    Bedtime is between 930 and 10 PM.  Wake up time is between 6 and 7 AM.  He estimates a 15-minute sleep latency.  He typically has 1 awakening per night which is brief, to use the bathroom or to get a drink of water.  He estimates 7 to 8 hours of sleep on a nightly basis.  He believes that 8 hours would be sufficient for him.  He describes no symptoms consistent with a limb movement disorder, parasomnia, or narcolepsy.    Medical history is significant for class I obesity with active efforts to lose weight through exercise.  He also has a history of stent placement in his left anterior descending artery without angina or myocardial infarction.  He has no personal history of hypertension or diabetes.  He denies acid reflux and allergy symptoms.    He has obtained a boil and bite appliance in order to achieve jaw protrusion during sleep.  This seems to have improved his snoring to " some degree but has been difficult to tolerate due to causing pain in his mouth.  He travels frequently for work and may be interested in an oral appliance if indicated.  He is also open to Pap therapy and has several acquaintances who use Pap with market benefits.           Medications:     Current Outpatient Medications   Medication Sig     aspirin (ASA) 81 MG tablet Take 1 tablet (81 mg) by mouth daily     atorvastatin (LIPITOR) 40 MG tablet Take 1 tablet (40 mg) by mouth daily     co-enzyme Q-10 100 MG CAPS capsule Take 200 mg by mouth daily     No current facility-administered medications for this visit.         No Known Allergies         Past Medical History:     Past Medical History:   Diagnosis Date     ADD (attention deficit hyperactivity disorder, inattentive type) 1997     CAD (coronary artery disease) 6/15    REMBERTO x 2 = LAD  EF 35-40% - Dr Roy-Geovanny     Colon polyp 4/15    tubular adenoma - due 5 yrs     Controlled substance agreement signed 8/16     Family history of coronary artery disease      Hyperlipidemia LDL goal <70      Medication management              Past Surgical History:      Past Surgical History:   Procedure Laterality Date     COLONOSCOPY  4/15    polyp x 1 6 mm - rectum - tubular adenoma - due 5 yrs     HEART CATH STENT COR W/WO PTCA  06/24/2015    REMBERTO x2 mid LAD, jailed diagonal     STRESS ECHO (METRO)  6/15    abnormal - followed by abnormal angiogram - LAD occlusion     VASECTOMY  11/11    dr perez            Social History:     Social History     Tobacco Use     Smoking status: Never Smoker     Smokeless tobacco: Never Used   Substance Use Topics     Alcohol use: Yes     Alcohol/week: 1.0 - 2.0 standard drinks     Types: 1 - 2 Standard drinks or equivalent per week     Comment: 1-2 glasses of wine per month            Family History:     Family History   Problem Relation Age of Onset     Chronic Obstructive Pulmonary Disease Mother         smoker     ALS Father         smoker  "    Cystic Fibrosis Son      C.A.D. Maternal Uncle         stents x 3     C.A.D. Paternal Uncle         MI at 56     C.A.D. Maternal Grandfather         age 56     C.A.D. Paternal Grandfather         age 60             Review of Systems:     A complete 10 point review of systems was negative other than HPI or as commented below.         Physical Examination:   /68   Pulse 67   Ht 1.753 m (5' 9.02\")   Wt 103 kg (227 lb)   SpO2 96%   BMI 33.51 kg/m     Neck Circumference: 16 inches   Constitutional:  Awake, alert, cooperative, in no apparent distress  Eyes: No icterus.  ENT: Mallampati Class: II.  Nose: Nares patent.  No septal deviation noted.  Tongue: Normal, shallow oral pharyngeal opening   Cardiovascular: Regular S1 and S2, no gallops or murmurs  Neck: Supple, no thyroid enlargement  Pulmonary:  Chest symmetric, lungs clear bilaterally and no crackles, wheezes or rales  Extremities:  No pretibial edema  Skin:  No rash or significant lesions visible  Gait:  Normal  Neurologic: Alert, oriented, no focal neurological deficit  Psychological: euthymic; affect appropriate            Data: All pertinent previous laboratory data reviewed     No results found for: PH, PHARTERIAL, PO2, [id number], SAT, PCO2, HCO3, BASEEXCESS, TAVIA, BEB  Lab Results   Component Value Date    TSH 1.68 10/15/2019    TSH 1.76 03/02/2016     Lab Results   Component Value Date    GLC 99 10/15/2019    GLC 85 02/14/2019     Lab Results   Component Value Date    HGB 15.3 10/15/2019    HGB 15.7 03/02/2016     Lab Results   Component Value Date    BUN 16 10/15/2019    BUN 19 02/14/2019    CR 0.86 10/15/2019    CR 0.85 02/14/2019     Lab Results   Component Value Date    AST 29 10/15/2019    AST 35 02/14/2019    ALT 62 10/15/2019    ALT 62 (A) 02/14/2019    ALKPHOS 55 10/15/2019    ALKPHOS 59 02/14/2019    BILITOTAL 0.6 10/15/2019    BILITOTAL 0.4 03/02/2016    BILICONJ 0.0 02/06/2008     No results found for: UAMP, UBARB, BENZODIAZEUR, " BELÉN, EDWIN, OPIT, UPCP    Sagrario Roy MD   12/5/2019   [address], [address]  [place] [address] [phone]    Copy to: Andrews Whiteside

## 2019-12-05 NOTE — PATIENT INSTRUCTIONS
Your blood pressure was checked while you were in clinic today.  Please read the guidelines below about what these numbers mean and what you should do about them.  Your systolic blood pressure is the top number.  This is the pressure when the heart is pumping.  Your diastolic blood pressure is the bottom number.  This is the pressure in between beats.  If your systolic blood pressure is less than 120 and your diastolic blood pressure is less than 80, then your blood pressure is normal. There is nothing more that you need to do about it  If your systolic blood pressure is 120-139 or your diastolic blood pressure is 80-89, your blood pressure may be higher than it should be.  You should have your blood pressure re-checked within a year by a primary care provider.  If your systolic blood pressure is 140 or greater or your diastolic blood pressure is 90 or greater, you may have high blood pressure.  High blood pressure is treatable, but if left untreated over time it can put you at risk for heart attack, stroke, or kidney failure.  You should have your blood pressure re-checked by a primary care provider within the next four weeks.  Your BMI is There is no height or weight on file to calculate BMI.  Weight management is a personal decision.  If you are interested in exploring weight loss strategies, the following discussion covers the approaches that may be successful. Body mass index (BMI) is one way to tell whether you are at a healthy weight, overweight, or obese. It measures your weight in relation to your height.  A BMI of 18.5 to 24.9 is in the healthy range. A person with a BMI of 25 to 29.9 is considered overweight, and someone with a BMI of 30 or greater is considered obese. More than two-thirds of American adults are considered overweight or obese.  Being overweight or obese increases the risk for further weight gain. Excess weight may lead to heart disease and diabetes.  Creating and following plans for  healthy eating and physical activity may help you improve your health.  Weight control is part of healthy lifestyle and includes exercise, emotional health, and healthy eating habits. Careful eating habits lifelong are the mainstay of weight control. Though there are significant health benefits from weight loss, long-term weight loss with diet alone may be very difficult to achieve- studies show long-term success with dietary management in less than 10% of people. Attaining a healthy weight may be especially difficult to achieve in those with severe obesity. In some cases, medications, devices and surgical management might be considered.  What can you do?  If you are overweight or obese and are interested in methods for weight loss, you should discuss this with your provider.     Consider reducing daily calorie intake by 500 calories.     Keep a food journal.     Avoiding skipping meals, consider cutting portions instead.    Diet combined with exercise helps maintain muscle while optimizing fat loss. Strength training is particularly important for building and maintaining muscle mass. Exercise helps reduce stress, increase energy, and improves fitness. Increasing exercise without diet control, however, may not burn enough calories to loose weight.       Start walking three days a week 10-20 minutes at a time    Work towards walking thirty minutes five days a week     Eventually, increase the speed of your walking for 1-2 minutes at time    In addition, we recommend that you review healthy lifestyles and methods for weight loss available through the National Institutes of Health patient information sites:  http://win.niddk.nih.gov/publications/index.htm    And look into health and wellness programs that may be available through your health insurance provider, employer, local community center, or cristi club.

## 2020-01-06 ENCOUNTER — OFFICE VISIT (OUTPATIENT)
Dept: SLEEP MEDICINE | Facility: CLINIC | Age: 55
End: 2020-01-06
Payer: COMMERCIAL

## 2020-01-06 DIAGNOSIS — R06.83 SNORING: ICD-10-CM

## 2020-01-06 PROCEDURE — G0399 HOME SLEEP TEST/TYPE 3 PORTA: HCPCS

## 2020-01-07 ENCOUNTER — DOCUMENTATION ONLY (OUTPATIENT)
Dept: SLEEP MEDICINE | Facility: CLINIC | Age: 55
End: 2020-01-07
Payer: COMMERCIAL

## 2020-01-07 NOTE — PROGRESS NOTES
This HSAT was performed using a Noxturnal T3 device which recorded snore, sound, movement activity, body position, nasal pressure, oronasal thermal airflow, pulse, oximetry and both chest and abdominal respiratory effort. HSAT data was restricted to the time patient states they were in bed.     HSAT was scored using 1B 4% hypopnea rule.     AHI: 2.8. Snoring was reported as loud.  Time with SpO2 below 89% was 2.5 minutes. The patient uses upper airway stimulator during the study.  Overall signal quality was good     Pt will follow up with sleep provider to determine appropriate therapy.     Ordering Kirill TORRES, MD Edwardo Hickey, RPS, RST System Clinical Specialist 01/07/2020

## 2020-01-08 NOTE — PROCEDURES
"HOME SLEEP STUDY INTERPRETATION    Patient: Ernesto Soliman  MRN: [id number]  YOB: 1965  Study Date: 2020  Referring Provider: Andrews Whiteside  Ordering Provider: Sagrario Roy MD     Indications for Home Study: Ernesto Soliman is a 54 year old male with a history of loud snoring which is disruptive to his wife's.  She also has witnessed apneic events on at least one occasion.  He does not awaken feeling refreshed and consumes more than 3 caffeinated beverages per day as a coping mechanism for sleepiness.  His score on the [place] Sleepiness Scale is 9 out of 24 which likely underestimates his degree of sleepiness.  Medical history is significant for class I obesity with active efforts to lose weight through exercise.  He also has a history of stent placement in his left anterior descending artery without angina or myocardial infarction.  He has no personal history of hypertension or diabetes.  He denies acid reflux and allergy symptoms.  He has obtained a boil and bite appliance in order to achieve jaw protrusion during sleep.  This seems to have improved his snoring to some degree but has been difficult to tolerate due to causing pain in his mouth.      Estimated body mass index is 33.51 kg/m  as calculated from the following:    Height as of 19: 1.753 m (5' 9.02\").    Weight as of 19: 103 kg (227 lb).  Total score - [place]: 9 (2019 11:10 AM)  STOP-BAN/8    Data: A full night home sleep study was performed recording the standard physiologic parameters including body position, movement, sound, nasal pressure, thermal oral airflow, chest and abdominal movements with respiratory inductance plethysmography, and oxygen saturation by pulse oximetry. Pulse rate was estimated by oximetry recording. This study was considered adequate based on > 4 hours of quality oximetry and respiratory recording. As specified by the AASM Manual for the Scoring of Sleep and Associated events, " version 2.3, Rule VIII.D 1B, 4% oxygen desaturation scoring for hypopneas is used as a standard of care on all home sleep apnea testing.    Analysis Time:  424 minutes    Respiration:   Sleep Associated Hypoxemia: sustained hypoxemia was not present. Baseline oxygen saturation was 91.8%.  Time with saturation less than or equal to 88% was 2.5 minutes. The lowest oxygen saturation was 85%.   Snoring: Snoring was present and described as loud.  Respiratory events: The home study revealed a presence of 0 obstructive apneas and 0 mixed and central apneas. There were 20 hypopneas resulting in a combined apnea/hypopnea index [AHI] of 2.8 events per hour.  AHI was 3.5 per hour supine, 1.4 per hour on left side, and 2.3 per hour on right side.   Pattern: Excluding events noted above, respiratory rate and pattern was Normal.    Position: Percent of time spent: supine - 63%, prone - 0%, on left - 18.5%, on right - 17.4%.    Heart Rate: By pulse oximetry normal rate was noted.     Assessment:   Obstructive sleep apnea was not demonstrated.  Sleep associated hypoxemia was not present.    Recommendations:  Consider an in-lab attended sleep study due to prominent sleep symptoms and possibility of a false negative HSAT.  Suggest optimizing sleep hygiene and avoiding sleep deprivation.  Weight management.    Diagnosis Code(s): Snoring R06.83    Sagrario Roy MD, January 8, 2020   Diplomate, American Board of Pediatrics, Sleep Medicine

## 2020-01-21 ENCOUNTER — OFFICE VISIT (OUTPATIENT)
Dept: SLEEP MEDICINE | Facility: CLINIC | Age: 55
End: 2020-01-21
Payer: COMMERCIAL

## 2020-01-21 VITALS
BODY MASS INDEX: 33.47 KG/M2 | WEIGHT: 226 LBS | RESPIRATION RATE: 16 BRPM | SYSTOLIC BLOOD PRESSURE: 106 MMHG | HEART RATE: 62 BPM | HEIGHT: 69 IN | DIASTOLIC BLOOD PRESSURE: 74 MMHG | OXYGEN SATURATION: 96 %

## 2020-01-21 DIAGNOSIS — R06.83 SNORING: Primary | ICD-10-CM

## 2020-01-21 PROCEDURE — 99214 OFFICE O/P EST MOD 30 MIN: CPT | Performed by: PHYSICIAN ASSISTANT

## 2020-01-21 ASSESSMENT — MIFFLIN-ST. JEOR: SCORE: 1845.51

## 2020-01-21 NOTE — PROGRESS NOTES
Sleep Follow-Up Visit:    Date on this visit: 1/21/2020    Ernesto Soliman comes in today for follow-up of his sleep study done on 1/6/2020. He was initially seen at the [facility] for snoring, daytime sleepiness. Medical history is significant for obesity, CAD (s/p stent).    Analysis Time:  424 minutes     Respiration:   Sleep Associated Hypoxemia: sustained hypoxemia was not present. Baseline oxygen saturation was 91.8%.  Time with saturation less than or equal to 88% was 2.5 minutes. The lowest oxygen saturation was 85%.   Snoring: Snoring was present and described as loud.  Respiratory events: The home study revealed a presence of 0 obstructive apneas and 0 mixed and central apneas. There were 20 hypopneas resulting in a combined apnea/hypopnea index [AHI] of 2.8 events per hour.  AHI was 3.5 per hour supine, 1.4 per hour on left side, and 2.3 per hour on right side.   Pattern: Excluding events noted above, respiratory rate and pattern was Normal.     Position: Percent of time spent: supine - 63%, prone - 0%, on left - 18.5%, on right - 17.4%.     Heart Rate: By pulse oximetry normal rate was noted.    He did not sleep well on the night of the test. He felt he was up about every 2 hours. He was bothered by the oxygen sensor.   He has been trying PureSleep dental appliance. That resolved the snoring and he slept better, but it caused discomfort of his teeth. His energy was noticeably better.     Past medical/surgical history, family history, social history, medications and allergies were reviewed.      Problem List:  Patient Active Problem List    Diagnosis Date Noted     Medication management      Priority: High     ADD (attention deficit hyperactivity disorder, inattentive type)      Priority: High     Controlled substance agreement signed      Priority: Medium     Hyperlipidemia LDL goal <70 07/17/2015     Priority: Medium     CAD (coronary artery disease)      Priority: Medium      Colon polyp      Priority: Medium     Family history of coronary artery disease      Priority: Medium        Impression/Plan:    (R06.83) Snoring  (primary encounter diagnosis)  Comment: This HST did not show significant apnea. He did not feel he slept well. His pretest probability was fairly high, STOPBANG 6/8, and he felt better energy when he used an OTC dental appliance. There is a good chance the HST underestimated his sleep disordered breathing.  Plan: We talked about performing an in lab test and/or pursuing a professionally made dental appliance. He would like to check his deductible and talk with his wife before scheduling an in lab study. He will contact me via [facility] when he is ready.      He will follow up with me in about 2 week(s) after the repeat study.     Twenty-five minutes spent with patient, all of which were spent face-to-face counseling, consulting, coordinating plan of care.      Bennett Goltz, PA-C    CC: MD Sagrario Parker MD

## 2020-01-21 NOTE — PATIENT INSTRUCTIONS
Your BMI is Body mass index is 33.37 kg/m .  Weight management is a personal decision.  If you are interested in exploring weight loss strategies, the following discussion covers the approaches that may be successful. Body mass index (BMI) is one way to tell whether you are at a healthy weight, overweight, or obese. It measures your weight in relation to your height.  A BMI of 18.5 to 24.9 is in the healthy range. A person with a BMI of 25 to 29.9 is considered overweight, and someone with a BMI of 30 or greater is considered obese. More than two-thirds of American adults are considered overweight or obese.  Being overweight or obese increases the risk for further weight gain. Excess weight may lead to heart disease and diabetes.  Creating and following plans for healthy eating and physical activity may help you improve your health.  Weight control is part of healthy lifestyle and includes exercise, emotional health, and healthy eating habits. Careful eating habits lifelong are the mainstay of weight control. Though there are significant health benefits from weight loss, long-term weight loss with diet alone may be very difficult to achieve- studies show long-term success with dietary management in less than 10% of people. Attaining a healthy weight may be especially difficult to achieve in those with severe obesity. In some cases, medications, devices and surgical management might be considered.  What can you do?  If you are overweight or obese and are interested in methods for weight loss, you should discuss this with your provider.     Consider reducing daily calorie intake by 500 calories.     Keep a food journal.     Avoiding skipping meals, consider cutting portions instead.    Diet combined with exercise helps maintain muscle while optimizing fat loss. Strength training is particularly important for building and maintaining muscle mass. Exercise helps reduce stress, increase energy, and improves  fitness. Increasing exercise without diet control, however, may not burn enough calories to loose weight.       Start walking three days a week 10-20 minutes at a time    Work towards walking thirty minutes five days a week     Eventually, increase the speed of your walking for 1-2 minutes at time    In addition, we recommend that you review healthy lifestyles and methods for weight loss available through the National Institutes of Health patient information sites:  http://win.niddk.nih.gov/publications/index.htm    And look into health and wellness programs that may be available through your health insurance provider, employer, local community center, or cristi club.    Weight management plan: Patient was referred to their PCP to discuss a diet and exercise plan.

## 2020-05-11 DIAGNOSIS — I25.10 CAD (CORONARY ARTERY DISEASE): ICD-10-CM

## 2020-05-11 RX ORDER — ATORVASTATIN CALCIUM 40 MG/1
40 TABLET, FILM COATED ORAL DAILY
Qty: 90 TABLET | Refills: 1 | Status: SHIPPED | OUTPATIENT
Start: 2020-05-11 | End: 2020-05-27

## 2020-05-27 ENCOUNTER — VIRTUAL VISIT (OUTPATIENT)
Dept: CARDIOLOGY | Facility: CLINIC | Age: 55
End: 2020-05-27
Payer: COMMERCIAL

## 2020-05-27 DIAGNOSIS — I25.10 CORONARY ARTERY DISEASE INVOLVING NATIVE CORONARY ARTERY OF NATIVE HEART WITHOUT ANGINA PECTORIS: Primary | ICD-10-CM

## 2020-05-27 PROCEDURE — 99214 OFFICE O/P EST MOD 30 MIN: CPT | Mod: 95 | Performed by: INTERNAL MEDICINE

## 2020-05-27 RX ORDER — ATORVASTATIN CALCIUM 40 MG/1
40 TABLET, FILM COATED ORAL DAILY
Qty: 90 TABLET | Refills: 1 | Status: SHIPPED | OUTPATIENT
Start: 2020-05-27 | End: 2020-10-15

## 2020-05-27 NOTE — LETTER
5/27/2020    Andrews Whiteside MD  [address] [facility] [address]    RE: Ernesto Soliman       Dear Colleague,    I had the pleasure of seeing Ernesto Soliman in the [facility].    This clinic visit was performed via telephone/video due to COVID[employer] restrictions.    Today, I had the pleasure of connecting with Ernesto Soliman for a follow-up visit.  He is a pleasant 53 year old patient with a past medical history of coronary artery disease status post stenting of mid LAD with 2 REMBERTO in June 2015 with with good angiographic results.  The first diagonal and first and second septal perforators were jailed and there was a smooth 35% stenosis in the distal LAD which was not intervened on. He also has hyperlipidemia. H presents to the clinic for a yearly follow-up visit. Last visit I restarted his cardiac meds which he had stopped as he thought he was cured of coronary disease.      Clinically, he has been doing well since last visit and is able to climb stairs without any difficulty. Most recent lipids show LDL of, TC of   in March 2016 while he was on Lipitor.  He has been off Lipitor since then.   and HDL of   mg/dl. TTE in 5/2020 was essentially normal. EF 55%, no WMA.     Assessment and plan  1.  Coronary artery disease status post PCI of mid LAD and jailed D1 and first and second perforators  2.  Hyperlipidemia- LDL mg/dl after restarting lipitor     Discussion  The patient has history of coronary disease and is status post PCI. He is not on BB due to low resting HR and BP. I will reevaluate him at next visit and reconsider starting beta-blocker. I will have him come back and see me in 1 yr with repeat lipid profile.      Plan  1.  Continue all meds   2.  Heart healthy diet  3.  Regular exercise     Thank you for allowing me to participate in the care of Ernesto Soliman    This note was completed in part using Dragon voice recognition software. Although reviewed after  completion, some word and grammatical errors may occur.    Bean Burnett MD  Cardiology    Orders Placed This Encounter   Procedures     Lipid Profile     ALT     Follow-Up with Cardiologist       No orders of the defined types were placed in this encounter.      There are no discontinued medications.    Encounter Diagnosis   Name Primary?     Coronary artery disease involving native coronary artery of native heart without angina pectoris Yes       CURRENT MEDICATIONS:  Current Outpatient Medications   Medication Sig Dispense Refill     aspirin (ASA) 81 MG tablet Take 1 tablet (81 mg) by mouth daily       atorvastatin (LIPITOR) 40 MG tablet Take 1 tablet (40 mg) by mouth daily 90 tablet 1     co-enzyme Q-10 100 MG CAPS capsule Take 200 mg by mouth daily       zinc gluconate 50 MG tablet Take 50 mg by mouth daily         ALLERGIES   No Known Allergies    PAST MEDICAL HISTORY:  Past Medical History:   Diagnosis Date     ADD (attention deficit hyperactivity disorder, inattentive type) 1997     CAD (coronary artery disease) 6/15    REMBERTO x 2 = LAD  EF 35-40% - Dr Roy-Geovanny     Colon polyp 4/15    tubular adenoma - due 5 yrs     Controlled substance agreement signed 8/16     Family history of coronary artery disease      Hyperlipidemia LDL goal <70      Medication management      Snoring     [facility] Sleep - Bennett Goltz       PAST SURGICAL HISTORY:  Past Surgical History:   Procedure Laterality Date     COLONOSCOPY  4/15    polyp x 1 6 mm - rectum - tubular adenoma - due 5 yrs     HEART CATH STENT COR W/WO PTCA  06/24/2015    REMBERTO x2 mid LAD, jailed diagonal     STRESS ECHO ([facility]RO)  6/15    abnormal - followed by abnormal angiogram - LAD occlusion     VASECTOMY  11/11    dr perez       FAMILY HISTORY:  Family History   Problem Relation Age of Onset     Chronic Obstructive Pulmonary Disease Mother         smoker     ALS Father         smoker     Cystic Fibrosis Son      C.A.D. Maternal Uncle         stents x 3     C.A.D.  Paternal Uncle         MI at 56     C.A.D. Maternal Grandfather         age 56     C.A.D. Paternal Grandfather         age 60       SOCIAL HISTORY:  Social History     Socioeconomic History     Marital status:      Spouse name: Nayely     Number of children: 3     Years of education: 16     Highest education level: Not on file   Occupational History     Employer: [employer]   Social Needs     Financial resource strain: Not on file     Food insecurity     Worry: Not on file     Inability: Not on file     Transportation needs     Medical: Not on file     Non-medical: Not on file   Tobacco Use     Smoking status: Never Smoker     Smokeless tobacco: Never Used   Substance and Sexual Activity     Alcohol use: Yes     Alcohol/week: 1.0 - 2.0 standard drinks     Types: 1 - 2 Standard drinks or equivalent per week     Comment: 1-2 per week glasses of wine per month     Drug use: No     Sexual activity: Yes     Partners: Female   Lifestyle     Physical activity     Days per week: Not on file     Minutes per session: Not on file     Stress: Not on file   Relationships     Social connections     Talks on phone: Not on file     Gets together: Not on file     Attends Islam service: Not on file     Active member of club or organization: Not on file     Attends meetings of clubs or organizations: Not on file     Relationship status: Not on file     Intimate partner violence     Fear of current or ex partner: Not on file     Emotionally abused: Not on file     Physically abused: Not on file     Forced sexual activity: Not on file   Other Topics Concern      Service Not Asked     Blood Transfusions Not Asked     Caffeine Concern No     Comment: 2 cups daily, occas pop     Occupational Exposure Not Asked     Hobby Hazards Not Asked     Sleep Concern Not Asked     Stress Concern Not Asked     Weight Concern Not Asked     Special Diet Not Asked     Back Care Not Asked     Exercise Yes     Comment: 1-3/wk     Bike  Helmet Not Asked     Seat Belt Yes     Self-Exams Not Asked     Parent/sibling w/ CABG, MI or angioplasty before 65F 55M? No   Social History Narrative     Not on file       General Appearance: No distress, normal body habitus, upright.  ENT/Mouth:  Normal head shape, no evidence of injury or laceration.  EYES: No scleral icterus, normal conjunctivae  Neck:  No evidence of thyromegaly  Chest/Lungs: No audible wheezing. Non labored breathing. No cough.  Cardiovascular: No evidence of elevated jugular venous pressure.   Abdomen:  No observed jaundice.  Extremities: No cyanosis.  Skin: No xanthelasma, normal skin collar. No evidence of facial lacerations.  Neurologic: No focal neurological defect. Normal speech.  Psychiatric: Alert and oriented x3      Thank you for allowing me to participate in the care of your patient.    Sincerely,     Bean Burnett MD     [facility]

## 2020-05-27 NOTE — PROGRESS NOTES
"Ernesto Soliman is a 54 year old male who is being evaluated via a billable video visit.      The patient has been notified of following:     \"This video visit will be conducted via a call between you and your physician/provider. We have found that certain health care needs can be provided without the need for an in-person physical exam.  This service lets us provide the care you need with a video conversation.  If a prescription is necessary we can send it directly to your pharmacy.  If lab work is needed we can place an order for that and you can then stop by our lab to have the test done at a later time.    Video visits are billed at different rates depending on your insurance coverage.  Please reach out to your insurance provider with any questions.    If during the course of the call the physician/provider feels a video visit is not appropriate, you will not be charged for this service.\"    Patient has given verbal consent for Video visit? Yes    How would you like to obtain your AVS? Mail a copy    Patient would like the video invitation sent by: Text to cell phone: [phone]    Will anyone else be joining your video visit? No  Review Of Systems  Skin: NEGATIVE  Eyes:Ears/Nose/Throat: NEGATIVE  Respiratory: NEGATIVE  Cardiovascular:NEGATIVE  Gastrointestinal: NEGATIVE  Genitourinary:NEGATIVE   Musculoskeletal: NEGATIVE  Neurologic: NEGATIVE  Psychiatric: NEGATIVE  Hematologic/Lymphatic/Immunologic: NEGATIVE  Endocrine:  NEGATIVE  Patient will obtain vital signs and have ready for MD. Carol Flower LPN    Video-Visit Details    Type of service:  Video Visit    Video Start Time: 8:06 AM  Video End Time: 8:36 AM    Originating Location (pt. Location): Home    Distant Location (provider location):  [facility]     Platform used for Video Visit: Doximity     This clinic visit was performed via telephone/video due to COVID-19 restrictions.    Today, I had the pleasure of " connecting with Ernesto Soliman for a follow-up visit.  He is a pleasant 53 year old patient with a past medical history of coronary artery disease status post stenting of mid LAD with 2 REMBERTO in June 2015 with with good angiographic results.  The first diagonal and first and second septal perforators were jailed and there was a smooth 35% stenosis in the distal LAD which was not intervened on. He also has hyperlipidemia. H presents to the clinic for a yearly follow-up visit. Last visit I restarted his cardiac meds which he had stopped as he thought he was cured of coronary disease.      Clinically, he has been doing well since last visit and is able to climb stairs without any difficulty. Most recent lipids show LDL of, TC of   in March 2016 while he was on Lipitor.  He has been off Lipitor since then.   and HDL of   mg/dl. TTE in 5/2020 was essentially normal. EF 55%, no WMA.     Assessment and plan  1.  Coronary artery disease status post PCI of mid LAD and jailed D1 and first and second perforators  2.  Hyperlipidemia- LDL mg/dl after restarting lipitor     Discussion  The patient has history of coronary disease and is status post PCI. He is not on BB due to low resting HR and BP. I will reevaluate him at next visit and reconsider starting beta-blocker. I will have him come back and see me in 1 yr with repeat lipid profile.      Plan  1.  Continue all meds   2.  Heart healthy diet  3.  Regular exercise     Thank you for allowing me to participate in the care of Ernesto Soliman    This note was completed in part using Dragon voice recognition software. Although reviewed after completion, some word and grammatical errors may occur.    Bean Burnett MD  Cardiology    Orders Placed This Encounter   Procedures     Lipid Profile     ALT     Follow-Up with Cardiologist       No orders of the defined types were placed in this encounter.      There are no discontinued medications.    Encounter Diagnosis   Name Primary?      Coronary artery disease involving native coronary artery of native heart without angina pectoris Yes       CURRENT MEDICATIONS:  Current Outpatient Medications   Medication Sig Dispense Refill     aspirin (ASA) 81 MG tablet Take 1 tablet (81 mg) by mouth daily       atorvastatin (LIPITOR) 40 MG tablet Take 1 tablet (40 mg) by mouth daily 90 tablet 1     co-enzyme Q-10 100 MG CAPS capsule Take 200 mg by mouth daily       zinc gluconate 50 MG tablet Take 50 mg by mouth daily         ALLERGIES   No Known Allergies    PAST MEDICAL HISTORY:  Past Medical History:   Diagnosis Date     ADD (attention deficit hyperactivity disorder, inattentive type) 1997     CAD (coronary artery disease) 6/15    REMBERTO x 2 = LAD  EF 35-40% - Dr Cheney     Colon polyp 4/15    tubular adenoma - due 5 yrs     Controlled substance agreement signed 8/16     Family history of coronary artery disease      Hyperlipidemia LDL goal <70      Medication management      Snoring     Fv Sleep - Bennett Goltz       PAST SURGICAL HISTORY:  Past Surgical History:   Procedure Laterality Date     COLONOSCOPY  4/15    polyp x 1 6 mm - rectum - tubular adenoma - due 5 yrs     HEART CATH STENT COR W/WO PTCA  06/24/2015    REMBERTO x2 mid LAD, jailed diagonal     STRESS ECHO (METRO)  6/15    abnormal - followed by abnormal angiogram - LAD occlusion     VASECTOMY  11/11    dr perez       FAMILY HISTORY:  Family History   Problem Relation Age of Onset     Chronic Obstructive Pulmonary Disease Mother         smoker     ALS Father         smoker     Cystic Fibrosis Son      C.A.D. Maternal Uncle         stents x 3     C.A.D. Paternal Uncle         MI at 56     C.A.D. Maternal Grandfather         age 56     C.A.D. Paternal Grandfather         age 60       SOCIAL HISTORY:  Social History     Socioeconomic History     Marital status:      Spouse name: Nayely     Number of children: 3     Years of education: 16     Highest education level: Not on file    Occupational History     Employer: [employer]   Social Needs     Financial resource strain: Not on file     Food insecurity     Worry: Not on file     Inability: Not on file     Transportation needs     Medical: Not on file     Non-medical: Not on file   Tobacco Use     Smoking status: Never Smoker     Smokeless tobacco: Never Used   Substance and Sexual Activity     Alcohol use: Yes     Alcohol/week: 1.0 - 2.0 standard drinks     Types: 1 - 2 Standard drinks or equivalent per week     Comment: 1-2 per week glasses of wine per month     Drug use: No     Sexual activity: Yes     Partners: Female   Lifestyle     Physical activity     Days per week: Not on file     Minutes per session: Not on file     Stress: Not on file   Relationships     Social connections     Talks on phone: Not on file     Gets together: Not on file     Attends Yazdanism service: Not on file     Active member of club or organization: Not on file     Attends meetings of clubs or organizations: Not on file     Relationship status: Not on file     Intimate partner violence     Fear of current or ex partner: Not on file     Emotionally abused: Not on file     Physically abused: Not on file     Forced sexual activity: Not on file   Other Topics Concern      Service Not Asked     Blood Transfusions Not Asked     Caffeine Concern No     Comment: 2 cups daily, occas pop     Occupational Exposure Not Asked     Hobby Hazards Not Asked     Sleep Concern Not Asked     Stress Concern Not Asked     Weight Concern Not Asked     Special Diet Not Asked     Back Care Not Asked     Exercise Yes     Comment: 1-3/wk     Bike Helmet Not Asked     Seat Belt Yes     Self-Exams Not Asked     Parent/sibling w/ CABG, MI or angioplasty before 65F 55M? No   Social History Narrative     Not on file       General Appearance: No distress, normal body habitus, upright.  ENT/Mouth:  Normal head shape, no evidence of injury or laceration.  EYES: No scleral icterus,  normal conjunctivae  Neck:  No evidence of thyromegaly  Chest/Lungs: No audible wheezing. Non labored breathing. No cough.  Cardiovascular: No evidence of elevated jugular venous pressure.   Abdomen:  No observed jaundice.  Extremities: No cyanosis.  Skin: No xanthelasma, normal skin collar. No evidence of facial lacerations.  Neurologic: No focal neurological defect. Normal speech.  Psychiatric: Alert and oriented x3      Bean Burnett MD

## 2020-10-15 DIAGNOSIS — I25.10 CORONARY ARTERY DISEASE INVOLVING NATIVE CORONARY ARTERY OF NATIVE HEART WITHOUT ANGINA PECTORIS: ICD-10-CM

## 2020-10-15 RX ORDER — ATORVASTATIN CALCIUM 40 MG/1
40 TABLET, FILM COATED ORAL DAILY
Qty: 90 TABLET | Refills: 2 | Status: SHIPPED | OUTPATIENT
Start: 2020-10-15 | End: 2021-06-03

## 2020-10-15 NOTE — TELEPHONE ENCOUNTER
Received refill request for:  Lipitor  Last OV was: 5/27/20  Labs/EKG: na  F/U scheduled: Follow-up orders 5/2021  New script sent to: [facility] Mail order     Triston Griffin LPN  [facility]  [phone]

## 2021-03-24 ENCOUNTER — MYC MEDICAL ADVICE (OUTPATIENT)
Dept: FAMILY MEDICINE | Facility: CLINIC | Age: 56
End: 2021-03-24

## 2021-03-24 NOTE — TELEPHONE ENCOUNTER
My chart message sent     Laura Luna RN, BSN  [facility]

## 2021-04-11 ENCOUNTER — HEALTH MAINTENANCE LETTER (OUTPATIENT)
Age: 56
End: 2021-04-11

## 2021-06-01 ENCOUNTER — MYC MEDICAL ADVICE (OUTPATIENT)
Dept: FAMILY MEDICINE | Facility: CLINIC | Age: 56
End: 2021-06-01

## 2021-06-01 DIAGNOSIS — Z79.899 MEDICATION MANAGEMENT: ICD-10-CM

## 2021-06-01 DIAGNOSIS — Z82.49 FAMILY HISTORY OF CORONARY ARTERY DISEASE: ICD-10-CM

## 2021-06-01 DIAGNOSIS — Z12.5 SCREENING FOR PROSTATE CANCER: ICD-10-CM

## 2021-06-01 DIAGNOSIS — R06.83 SNORING: ICD-10-CM

## 2021-06-01 DIAGNOSIS — K63.5 POLYP OF COLON, UNSPECIFIED PART OF COLON, UNSPECIFIED TYPE: ICD-10-CM

## 2021-06-01 DIAGNOSIS — Z00.00 ROUTINE GENERAL MEDICAL EXAMINATION AT A HEALTH CARE FACILITY: Primary | ICD-10-CM

## 2021-06-01 DIAGNOSIS — I25.10 CORONARY ARTERY DISEASE INVOLVING NATIVE CORONARY ARTERY WITHOUT ANGINA PECTORIS, UNSPECIFIED WHETHER NATIVE OR TRANSPLANTED HEART: ICD-10-CM

## 2021-06-01 DIAGNOSIS — F90.0 ATTENTION DEFICIT HYPERACTIVITY DISORDER (ADHD), PREDOMINANTLY INATTENTIVE TYPE: ICD-10-CM

## 2021-06-01 DIAGNOSIS — Z12.11 SCREEN FOR COLON CANCER: ICD-10-CM

## 2021-06-01 DIAGNOSIS — E78.5 HYPERLIPIDEMIA LDL GOAL <70: ICD-10-CM

## 2021-06-01 NOTE — TELEPHONE ENCOUNTER
Please order labs that pt will need for his PX     He aayush; be coming in for labs on 6/25/2021 and the PX on 6/28/2021    Thank you     Laura Luna RN, BSN  [facility]

## 2021-06-01 NOTE — TELEPHONE ENCOUNTER
My chart message sent       Awaiting reply     Laura Luna RN, BSN  [facility]

## 2021-06-03 DIAGNOSIS — I25.10 CORONARY ARTERY DISEASE INVOLVING NATIVE CORONARY ARTERY OF NATIVE HEART WITHOUT ANGINA PECTORIS: ICD-10-CM

## 2021-06-03 RX ORDER — ATORVASTATIN CALCIUM 40 MG/1
40 TABLET, FILM COATED ORAL DAILY
Qty: 90 TABLET | Refills: 0 | Status: SHIPPED | OUTPATIENT
Start: 2021-06-03 | End: 2021-06-08

## 2021-06-08 ENCOUNTER — OFFICE VISIT (OUTPATIENT)
Dept: FAMILY MEDICINE | Facility: CLINIC | Age: 56
End: 2021-06-08
Payer: COMMERCIAL

## 2021-06-08 VITALS
WEIGHT: 215 LBS | DIASTOLIC BLOOD PRESSURE: 72 MMHG | BODY MASS INDEX: 31.84 KG/M2 | SYSTOLIC BLOOD PRESSURE: 122 MMHG | HEIGHT: 69 IN | TEMPERATURE: 98.3 F | HEART RATE: 82 BPM | OXYGEN SATURATION: 99 % | RESPIRATION RATE: 20 BRPM

## 2021-06-08 DIAGNOSIS — I25.10 CORONARY ARTERY DISEASE INVOLVING NATIVE CORONARY ARTERY OF NATIVE HEART WITHOUT ANGINA PECTORIS: ICD-10-CM

## 2021-06-08 DIAGNOSIS — F90.0 ATTENTION DEFICIT HYPERACTIVITY DISORDER (ADHD), PREDOMINANTLY INATTENTIVE TYPE: ICD-10-CM

## 2021-06-08 DIAGNOSIS — E66.811 CLASS 1 OBESITY WITH SERIOUS COMORBIDITY AND BODY MASS INDEX (BMI) OF 31.0 TO 31.9 IN ADULT, UNSPECIFIED OBESITY TYPE: ICD-10-CM

## 2021-06-08 DIAGNOSIS — R06.83 SNORING: ICD-10-CM

## 2021-06-08 DIAGNOSIS — Z82.49 FAMILY HISTORY OF CORONARY ARTERY DISEASE: ICD-10-CM

## 2021-06-08 DIAGNOSIS — Z00.00 ROUTINE GENERAL MEDICAL EXAMINATION AT A HEALTH CARE FACILITY: Primary | ICD-10-CM

## 2021-06-08 DIAGNOSIS — E78.5 HYPERLIPIDEMIA LDL GOAL <70: ICD-10-CM

## 2021-06-08 DIAGNOSIS — Z12.11 SCREEN FOR COLON CANCER: ICD-10-CM

## 2021-06-08 DIAGNOSIS — Z79.899 MEDICATION MANAGEMENT: ICD-10-CM

## 2021-06-08 DIAGNOSIS — Z12.5 SCREENING FOR PROSTATE CANCER: ICD-10-CM

## 2021-06-08 DIAGNOSIS — K63.5 POLYP OF COLON, UNSPECIFIED PART OF COLON, UNSPECIFIED TYPE: ICD-10-CM

## 2021-06-08 LAB
ALBUMIN UR-MCNC: NEGATIVE MG/DL
APPEARANCE UR: CLEAR
BILIRUB UR QL STRIP: NEGATIVE
COLOR UR AUTO: YELLOW
CREAT UR-MCNC: 58 MG/DL
ERYTHROCYTE [DISTWIDTH] IN BLOOD BY AUTOMATED COUNT: 12.7 % (ref 10–15)
GLUCOSE UR STRIP-MCNC: NEGATIVE MG/DL
HCT VFR BLD AUTO: 45.8 % (ref 40–53)
HGB BLD-MCNC: 15.9 G/DL (ref 13.3–17.7)
HGB UR QL STRIP: NEGATIVE
KETONES UR STRIP-MCNC: NEGATIVE MG/DL
LEUKOCYTE ESTERASE UR QL STRIP: NEGATIVE
MCH RBC QN AUTO: 29.3 PG (ref 26.5–33)
MCHC RBC AUTO-ENTMCNC: 34.7 G/DL (ref 31.5–36.5)
MCV RBC AUTO: 84 FL (ref 78–100)
MICROALBUMIN UR-MCNC: <5 MG/L
MICROALBUMIN/CREAT UR: NORMAL MG/G CR (ref 0–17)
NITRATE UR QL: NEGATIVE
PH UR STRIP: 5.5 PH (ref 5–7)
PLATELET # BLD AUTO: 184 10E9/L (ref 150–450)
PSA SERPL-ACNC: 2.61 UG/L (ref 0–4)
RBC # BLD AUTO: 5.43 10E12/L (ref 4.4–5.9)
SOURCE: NORMAL
SP GR UR STRIP: 1.01 (ref 1–1.03)
UROBILINOGEN UR STRIP-ACNC: 0.2 EU/DL (ref 0.2–1)
WBC # BLD AUTO: 6.7 10E9/L (ref 4–11)

## 2021-06-08 PROCEDURE — 85027 COMPLETE CBC AUTOMATED: CPT | Performed by: FAMILY MEDICINE

## 2021-06-08 PROCEDURE — G0103 PSA SCREENING: HCPCS | Performed by: FAMILY MEDICINE

## 2021-06-08 PROCEDURE — 81003 URINALYSIS AUTO W/O SCOPE: CPT | Performed by: FAMILY MEDICINE

## 2021-06-08 PROCEDURE — 80061 LIPID PANEL: CPT | Performed by: FAMILY MEDICINE

## 2021-06-08 PROCEDURE — 84443 ASSAY THYROID STIM HORMONE: CPT | Performed by: FAMILY MEDICINE

## 2021-06-08 PROCEDURE — 36415 COLL VENOUS BLD VENIPUNCTURE: CPT | Performed by: FAMILY MEDICINE

## 2021-06-08 PROCEDURE — 99396 PREV VISIT EST AGE 40-64: CPT | Performed by: FAMILY MEDICINE

## 2021-06-08 PROCEDURE — 82043 UR ALBUMIN QUANTITATIVE: CPT | Performed by: FAMILY MEDICINE

## 2021-06-08 PROCEDURE — 80053 COMPREHEN METABOLIC PANEL: CPT | Performed by: FAMILY MEDICINE

## 2021-06-08 PROCEDURE — 82550 ASSAY OF CK (CPK): CPT | Performed by: FAMILY MEDICINE

## 2021-06-08 RX ORDER — ATORVASTATIN CALCIUM 40 MG/1
40 TABLET, FILM COATED ORAL DAILY
Qty: 90 TABLET | Refills: 3 | Status: SHIPPED | OUTPATIENT
Start: 2021-06-08 | End: 2021-12-08

## 2021-06-08 SDOH — HEALTH STABILITY: MENTAL HEALTH: HOW OFTEN DO YOU HAVE 6 OR MORE DRINKS ON ONE OCCASION?: NOT ASKED

## 2021-06-08 SDOH — HEALTH STABILITY: MENTAL HEALTH: HOW MANY STANDARD DRINKS CONTAINING ALCOHOL DO YOU HAVE ON A TYPICAL DAY?: NOT ASKED

## 2021-06-08 SDOH — HEALTH STABILITY: MENTAL HEALTH: HOW OFTEN DO YOU HAVE A DRINK CONTAINING ALCOHOL?: NOT ASKED

## 2021-06-08 ASSESSMENT — PATIENT HEALTH QUESTIONNAIRE - PHQ9: SUM OF ALL RESPONSES TO PHQ QUESTIONS 1-9: 0

## 2021-06-08 ASSESSMENT — MIFFLIN-ST. JEOR: SCORE: 1790.61

## 2021-06-08 NOTE — LETTER
[facility]  [address] [place] [address]  [phone]                    June 14, 2021    Ernesto Soliman  [address] [id number]      Dear Ernesto,    Here is a summary of your recent test results:  Labs are overall quite good, except     Elevated triglycerides     We advise:     Continue current cares.   Balanced low cholesterol diet.   Regular exercise.   Weight loss.     Your test results are enclosed.      Please contact me if you have any questions.         Thank you very much for trusting [facility].     Healthy regards,          Andrews Whiteside M.D.        Results for orders placed or performed in visit on 06/08/21   Comprehensive metabolic panel     Status: Abnormal   Result Value Ref Range    Sodium 137 133 - 144 mmol/L    Potassium 5.2 3.4 - 5.3 mmol/L    Chloride 107 94 - 109 mmol/L    Carbon Dioxide 28 20 - 32 mmol/L    Anion Gap 2 (L) 3 - 14 mmol/L    Glucose 90 70 - 99 mg/dL    Urea Nitrogen 17 7 - 30 mg/dL    Creatinine 0.92 0.66 - 1.25 mg/dL    GFR Estimate >90 >60 mL/min/[1.73_m2]    GFR Estimate If Black >90 >60 mL/min/[1.73_m2]    Calcium 9.1 8.5 - 10.1 mg/dL    Bilirubin Total 0.4 0.2 - 1.3 mg/dL    Albumin 4.1 3.4 - 5.0 g/dL    Protein Total 7.4 6.8 - 8.8 g/dL    Alkaline Phosphatase 56 40 - 150 U/L    ALT 64 0 - 70 U/L    AST 36 0 - 45 U/L   Lipid panel reflex to direct LDL Fasting     Status: Abnormal   Result Value Ref Range    Cholesterol 172 <200 mg/dL    Triglycerides 208 (H) <150 mg/dL    HDL Cholesterol 53 >39 mg/dL    LDL Cholesterol Calculated 77 <100 mg/dL    Non HDL Cholesterol 119 <130 mg/dL   CK total     Status: None   Result Value Ref Range    CK Total 175 30 - 300 U/L   CBC with platelets     Status: None   Result Value Ref Range    WBC 6.7 4.0 - 11.0 10e9/L    RBC Count 5.43 4.4 - 5.9 10e12/L    Hemoglobin 15.9 13.3 - 17.7 g/dL    Hematocrit 45.8 40.0 - 53.0 %    MCV 84 78 - 100 fl    MCH  29.3 26.5 - 33.0 pg    MCHC 34.7 31.5 - 36.5 g/dL    RDW 12.7 10.0 - 15.0 %    Platelet Count 184 150 - 450 10e9/L   TSH with free T4 reflex     Status: None   Result Value Ref Range    TSH 2.35 0.40 - 4.00 mU/L   UA reflex to Microscopic and Culture     Status: None    Specimen: Midstream Urine   Result Value Ref Range    Color Urine Yellow     Appearance Urine Clear     Glucose Urine Negative NEG^Negative mg/dL    Bilirubin Urine Negative NEG^Negative    Ketones Urine Negative NEG^Negative mg/dL    Specific Gravity Urine 1.015 1.003 - 1.035    Blood Urine Negative NEG^Negative    pH Urine 5.5 5.0 - 7.0 pH    Protein Albumin Urine Negative NEG^Negative mg/dL    Urobilinogen Urine 0.2 0.2 - 1.0 EU/dL    Nitrite Urine Negative NEG^Negative    Leukocyte Esterase Urine Negative NEG^Negative    Source Midstream Urine    Albumin Random Urine Quantitative with Creat Ratio     Status: None   Result Value Ref Range    Creatinine Urine 58 mg/dL    Albumin Urine mg/L <5 mg/L    Albumin Urine mg/g Cr Unable to calculate due to low value 0 - 17 mg/g Cr   Prostate spec antigen screen     Status: None   Result Value Ref Range    PSA 2.61 0 - 4 ug/L

## 2021-06-08 NOTE — PROGRESS NOTES
[facility]  [place]    SUBJECTIVE    CC: Ernesto Soliman is an 55 year old male who presents for preventative health visit.     Patient has been advised of split billing requirements and indicates understanding: Yes     Healthy Habits:     Getting at least 3 servings of Calcium per day:  Yes    Bi-annual eye exam:  NO    Dental care twice a year:  NO    Sleep apnea or symptoms of sleep apnea:  None    Diet:  Regular (no restrictions)    Frequency of exercise:  1 day/week    Duration of exercise:  15-30 minutes    Taking medications regularly:  Yes    Medication side effects:  None    PHQ-2 Total Score: 0    Additional concerns today:  No    Ability to successfully perform activities of daily living: Yes, no assistance needed  Home safety:  none identified   Hearing impairment: Yes,     CAD/Hyperlipidemia Follow-Up      Are you regularly taking any medication or supplement to lower your cholesterol?   Yes- lipitor    Are you having muscle aches or other side effects that you think could be caused by your cholesterol lowering medication?  No     Last visit with Dr Burnett - 5/2020 - no issues - on atorvastatin    Recent Labs   Lab Test 10/15/19  0744 02/14/19 07/22/15  0742 07/22/15  0742 06/25/15  0305   CHOL 127 261*   < > 160 242*   HDL 51 48.9   < > 44 54   LDL 54 177   < > 79 142*   TRIG 112 173   < > 184* 232*   CHOLHDLRATIO  --   --   --  3.6 4.5    < > = values in this interval not displayed.     ADD - doing ok    Today's PHQ-2 Score:   PHQ-2 ( 1999 Pfizer) 6/8/2021   Q1: Little interest or pleasure in doing things 0   Q2: Feeling down, depressed or hopeless 0   PHQ-2 Score 0   Q1: Little interest or pleasure in doing things Not at all   Q2: Feeling down, depressed or hopeless Not at all   PHQ-2 Score 0       Abuse: Current or Past(Physical, Sexual or Emotional)- No  Do you feel safe in your environment? Yes    Have you ever done Advance Care Planning? (For example, a Health Directive, POLST, or a  discussion with a medical provider or your loved ones about your wishes): No, advance care planning information given to patient to review.  Advanced care planning was discussed at today's visit.    Social History     Tobacco Use     Smoking status: Never Smoker     Smokeless tobacco: Never Used   Substance Use Topics     Alcohol use: Not Currently     Alcohol/week: 0.0 - 1.0 standard drinks     Comment: 1-2 per week glasses of wine per month         Alcohol Use 6/8/2021   Prescreen: >3 drinks/day or >7 drinks/week? No   Prescreen: >3 drinks/day or >7 drinks/week? -       Last PSA:   PSA   Date Value Ref Range Status   10/15/2019 3.41 0 - 4 ug/L Final     Comment:     Assay Method:  Chemiluminescence using Siemens Vista analyzer       Reviewed orders with patient. Reviewed health maintenance and updated orders accordingly - Yes    Health Maintenance     Colonoscopy:  Due 4/2015   FIT:  given              PSA:  pending   DEXA:  NA    Health Maintenance Due   Topic Date Due     ZOSTER IMMUNIZATION (1 of 2) Never done     COLORECTAL CANCER SCREENING  04/27/2020     PSA  10/15/2020       Current Problem List    Patient Active Problem List   Diagnosis     ADD (attention deficit hyperactivity disorder, inattentive type)     Family history of coronary artery disease     Medication management     Colon polyp     Coronary artery disease involving native coronary artery     Hyperlipidemia LDL goal <70     Controlled substance agreement signed     Snoring     Class 1 obesity with serious comorbidity and body mass index (BMI) of 31.0 to 31.9 in adult, unspecified obesity type       Past Medical History    Past Medical History:   Diagnosis Date     ADD (attention deficit hyperactivity disorder, inattentive type) 1997     CAD (coronary artery disease) 06/2015    REMBERTO x 2 = LAD  EF 35-40% - Dr Cheney     Colon polyp 04/2015    tubular adenoma - due 5 yrs     Controlled substance agreement signed 08/2016     Family history of  coronary artery disease      Hyperlipidemia LDL goal <70      Medication management      Snoring     [facility] - Bennett Goltz       Past Surgical History    Past Surgical History:   Procedure Laterality Date     COLONOSCOPY  4/15    polyp x 1 6 mm - rectum - tubular adenoma - due 5 yrs     HEART CATH STENT COR W/WO PTCA  06/24/2015    REMBERTO x2 mid LAD, jailed diagonal     STRESS ECHO (METRO)  6/15    abnormal - followed by abnormal angiogram - LAD occlusion     VASECTOMY  11/11    dr perez       Current Medications    Current Outpatient Medications   Medication Sig Dispense Refill     aspirin (ASA) 81 MG tablet Take 1 tablet (81 mg) by mouth daily       atorvastatin (LIPITOR) 40 MG tablet Take 1 tablet (40 mg) by mouth daily You need to make an appointment for refills. [phone] 90 tablet 3     co-enzyme Q-10 100 MG CAPS capsule Take 200 mg by mouth daily       zinc gluconate 50 MG tablet Take 50 mg by mouth daily         Allergies    No Known Allergies    Immunizations    Immunization History   Administered Date(s) Administered     COVID-19,PF,Pfizer 03/20/2021, 04/10/2021     TD (ADULT, 7+) 01/01/2003, 01/01/2008     TDAP Vaccine (Boostrix) 11/30/2012     Twinrix A/B 02/06/2008, 11/30/2012, 03/02/2016       Family History    Family History   Problem Relation Age of Onset     Chronic Obstructive Pulmonary Disease Mother         smoker     ALS Father         smoker     Cystic Fibrosis Son      C.A.D. Maternal Uncle         stents x 3     C.A.D. Paternal Uncle         MI at 56     C.A.D. Maternal Grandfather         age 56     C.A.D. Paternal Grandfather         age 60       Social History    Social History     Socioeconomic History     Marital status:      Spouse name: Nayely     Number of children: 3     Years of education: 16     Highest education level: Not on file   Occupational History     Employer: [employer]   Social Needs     Financial resource strain: Not on file     Food insecurity     Worry: Not  on file     Inability: Not on file     Transportation needs     Medical: Not on file     Non-medical: Not on file   Tobacco Use     Smoking status: Never Smoker     Smokeless tobacco: Never Used   Substance and Sexual Activity     Alcohol use: Not Currently     Alcohol/week: 0.0 - 1.0 standard drinks     Comment: 1-2 per week glasses of wine per month     Drug use: No     Sexual activity: Yes     Partners: Female   Lifestyle     Physical activity     Days per week: Not on file     Minutes per session: Not on file     Stress: Not on file   Relationships     Social connections     Talks on phone: Not on file     Gets together: Not on file     Attends Advent service: Not on file     Active member of club or organization: Not on file     Attends meetings of clubs or organizations: Not on file     Relationship status: Not on file     Intimate partner violence     Fear of current or ex partner: Not on file     Emotionally abused: Not on file     Physically abused: Not on file     Forced sexual activity: Not on file   Other Topics Concern      Service Not Asked     Blood Transfusions Not Asked     Caffeine Concern No     Comment: 2 cups daily, occas pop     Occupational Exposure Not Asked     Hobby Hazards Not Asked     Sleep Concern Not Asked     Stress Concern Not Asked     Weight Concern Not Asked     Special Diet Not Asked     Back Care Not Asked     Exercise Yes     Comment: 1-3/wk     Bike Helmet Not Asked     Seat Belt Yes     Self-Exams Not Asked     Parent/sibling w/ CABG, MI or angioplasty before 65F 55M? No   Social History Narrative     Not on file       ROS    CONSTITUTIONAL: NEGATIVE for fever, chills, change in weight  INTEGUMENTARY/SKIN: NEGATIVE for worrisome rashes, moles or lesions  EYES: NEGATIVE for vision changes or irritation  ENT/MOUTH: NEGATIVE for ear, mouth and throat problems  RESP: NEGATIVE for significant cough or SOB  CV: NEGATIVE for chest pain, palpitations or peripheral  "edema  GI: NEGATIVE for nausea, abdominal pain, heartburn, or change in bowel habits  : NEGATIVE for frequency, dysuria, or hematuria  MUSCULOSKELETAL: NEGATIVE for significant arthralgias or myalgia  NEURO: NEGATIVE for weakness, dizziness or paresthesias  ENDOCRINE: NEGATIVE for temperature intolerance, skin/hair changes  HEME: NEGATIVE for bleeding problems  PSYCHIATRIC: NEGATIVE for changes in mood or affect    OBJECTIVE    /72   Pulse 82   Temp 98.3  F (36.8  C)   Resp 20   Ht 1.753 m (5' 9\")   Wt 97.5 kg (215 lb)   SpO2 99%   BMI 31.75 kg/m      EXAM:    GENERAL: healthy, alert and no distress  EYES: Eyes grossly normal to inspection, PERRL and conjunctivae and sclerae normal  HENT: ear canals and TM's normal, nose and mouth without ulcers or lesions  NECK: no adenopathy, no asymmetry, masses, or scars and thyroid normal to palpation  RESP: lungs clear to auscultation - no rales, rhonchi or wheezes  CV: regular rate and rhythm, normal S1 S2, no S3 or S4, no murmur, click or rub, no peripheral edema and peripheral pulses strong  ABDOMEN: soft, nontender, no hepatosplenomegaly, no masses and bowel sounds normal   (male): testicles normal without atrophy or masses, no hernias and penis normal without urethral discharge  RECTAL: normal sphincter tone, no rectal masses, prostate of normal size for age, smooth, nontender without masses/nodules and prostate 1-2+ enlarged, nontender  MS: no gross musculoskeletal defects noted, no edema  SKIN: no suspicious lesions or rashes  NEURO: Normal strength and tone, mentation intact and speech normal  PSYCH: mentation appears normal, affect normal/bright  LYMPH: no cervical, supraclavicular, axillary, or inguinal adenopathy    DIAGNOSTICS/PROCEDURES    Pending    ASSESSMENT      ICD-10-CM    1. Routine general medical examination at a health care facility  Z00.00 Comprehensive metabolic panel     Lipid panel reflex to direct LDL Fasting     CK total     CBC " with platelets     TSH with free T4 reflex     UA reflex to Microscopic and Culture     Albumin Random Urine Quantitative with Creat Ratio     Prostate spec antigen screen     Fecal colorectal cancer screen FIT     GASTROENTEROLOGY ADULT REF PROCEDURE ONLY     Echocardiogram Exercise Stress     atorvastatin (LIPITOR) 40 MG tablet   2. Coronary artery disease involving native coronary artery of native heart without angina pectoris  I25.10 Comprehensive metabolic panel     Lipid panel reflex to direct LDL Fasting     CBC with platelets     TSH with free T4 reflex     UA reflex to Microscopic and Culture     Albumin Random Urine Quantitative with Creat Ratio     Echocardiogram Exercise Stress     atorvastatin (LIPITOR) 40 MG tablet   3. Hyperlipidemia LDL goal <70  E78.5 Comprehensive metabolic panel     Lipid panel reflex to direct LDL Fasting     CK total     Echocardiogram Exercise Stress     atorvastatin (LIPITOR) 40 MG tablet   4. ADD (attention deficit hyperactivity disorder, inattentive type)  F90.0    5. Family history of coronary artery disease  Z82.49 Comprehensive metabolic panel     Lipid panel reflex to direct LDL Fasting     Echocardiogram Exercise Stress     atorvastatin (LIPITOR) 40 MG tablet   6. Snoring  R06.83    7. Polyp of colon, unspecified part of colon, unspecified type  K63.5 Fecal colorectal cancer screen FIT     GASTROENTEROLOGY ADULT REF PROCEDURE ONLY   8. Class 1 obesity with serious comorbidity and body mass index (BMI) of 31.0 to 31.9 in adult, unspecified obesity type  E66.9 TSH with free T4 reflex    Z68.31    9. Screening for prostate cancer  Z12.5 Prostate spec antigen screen   10. Screen for colon cancer  Z12.11 Fecal colorectal cancer screen FIT     GASTROENTEROLOGY ADULT REF PROCEDURE ONLY   11. Medication management  Z79.899 Comprehensive metabolic panel     Lipid panel reflex to direct LDL Fasting     CK total     CBC with platelets     TSH with free T4 reflex     UA reflex to  Microscopic and Culture     Albumin Random Urine Quantitative with Creat Ratio     Echocardiogram Exercise Stress       PLAN    Discussed treatment/modality options, including risk and benefits, he desires:    advised alcohol consumption 1oz per day or less, advised aspirin 81 mg po daily, advised 1 multivitamin per day, advised calcium 6230-3610 mg/d and Vitamin D 800-1200 IU/d, advised dentist every 6 months, advised diet, exercise, and weight loss, advised opthalmologist every 1-2 years, further health care maintenance, further lab(s), medication refill(s) and observation    Discussed controversies surrounding PSA. Specifically reviewed 2017 USPSTF findings recommending discussion of PSA testing for men ages 55-69.  Reviewed findings of the  Randomized Study of Screening for Prostate Cancer which showed a 30% reduction in advanced stage prostate cancer and a 20% reduction in death rate from prostate cancer in this age group. PSA-based screening may prevent up to 2 deaths and up to 3 cases of metastatic disease per 1,000 men screened over 13 years.    We've elected to do PSA this year after discussing these controversies.    All diagnosis above reviewed and noted above, otherwise stable.      See [email or web address] orders for further details.      1) med refills    2) labs pending    3) immunizations reviewed, consider shingrix    4) desires ED meds - PDE's - stress echo    Return in about 1 year (around 6/8/2022) for Complete Physical, Medication Recheck Visit, Follow Up Chronic, and as needed.    Health Maintenance Due   Topic Date Due     ZOSTER IMMUNIZATION (1 of 2) Never done     COLORECTAL CANCER SCREENING  04/27/2020     PSA  10/15/2020       COUNSELING    Reviewed preventive health counseling, as reflected in patient instructions    BP Readings from Last 1 Encounters:   06/08/21 122/72     Estimated body mass index is 31.75 kg/m  as calculated from the following:    Height as of this encounter: 1.753 m (5'  "9\").    Weight as of this encounter: 97.5 kg (215 lb).      Weight management plan: diet and exercise     reports that he has never smoked. He has never used smokeless tobacco.      Counseling Resources:    ATP IV Guidelines  Pooled Cohorts Equation Calculator  FRAX Risk Assessment  ICSI Preventive Guidelines  Dietary Guidelines for Americans, 2010  USDA's MyPlate  ASA Prophylaxis  Lung CA Screening           Andrews Whiteside MD, FAAFP     [facility]  [address] [place] [id number]  [email or web address]   Office: [phone]  Fax: [phone]  Pager: [phone]     "

## 2021-06-09 LAB
ALBUMIN SERPL-MCNC: 4.1 G/DL (ref 3.4–5)
ALP SERPL-CCNC: 56 U/L (ref 40–150)
ALT SERPL W P-5'-P-CCNC: 64 U/L (ref 0–70)
ANION GAP SERPL CALCULATED.3IONS-SCNC: 2 MMOL/L (ref 3–14)
AST SERPL W P-5'-P-CCNC: 36 U/L (ref 0–45)
BILIRUB SERPL-MCNC: 0.4 MG/DL (ref 0.2–1.3)
BUN SERPL-MCNC: 17 MG/DL (ref 7–30)
CALCIUM SERPL-MCNC: 9.1 MG/DL (ref 8.5–10.1)
CHLORIDE SERPL-SCNC: 107 MMOL/L (ref 94–109)
CHOLEST SERPL-MCNC: 172 MG/DL
CK SERPL-CCNC: 175 U/L (ref 30–300)
CO2 SERPL-SCNC: 28 MMOL/L (ref 20–32)
CREAT SERPL-MCNC: 0.92 MG/DL (ref 0.66–1.25)
GFR SERPL CREATININE-BSD FRML MDRD: >90 ML/MIN/{1.73_M2}
GLUCOSE SERPL-MCNC: 90 MG/DL (ref 70–99)
HDLC SERPL-MCNC: 53 MG/DL
LDLC SERPL CALC-MCNC: 77 MG/DL
NONHDLC SERPL-MCNC: 119 MG/DL
POTASSIUM SERPL-SCNC: 5.2 MMOL/L (ref 3.4–5.3)
PROT SERPL-MCNC: 7.4 G/DL (ref 6.8–8.8)
SODIUM SERPL-SCNC: 137 MMOL/L (ref 133–144)
TRIGL SERPL-MCNC: 208 MG/DL
TSH SERPL DL<=0.005 MIU/L-ACNC: 2.35 MU/L (ref 0.4–4)

## 2021-06-22 ENCOUNTER — TELEPHONE (OUTPATIENT)
Dept: FAMILY MEDICINE | Facility: CLINIC | Age: 56
End: 2021-06-22

## 2021-06-22 DIAGNOSIS — I25.10 CORONARY ARTERY DISEASE INVOLVING NATIVE CORONARY ARTERY WITHOUT ANGINA PECTORIS, UNSPECIFIED WHETHER NATIVE OR TRANSPLANTED HEART: Primary | ICD-10-CM

## 2021-06-22 DIAGNOSIS — Z79.899 MEDICATION MANAGEMENT: ICD-10-CM

## 2021-06-22 DIAGNOSIS — E78.5 HYPERLIPIDEMIA LDL GOAL <70: ICD-10-CM

## 2021-06-22 NOTE — TELEPHONE ENCOUNTER
----- Message from Caro Wilson sent at 2021 11:45 AM CDT -----  Regarding: Medical Necessity Payer Criteria Not Met  Medical Necessity Payer Criteria Not Met    Patient Name:  Ernesto Solmian  :    1965  MRN:    [id number]    Dr. Whiteside,    This patient does not appear to meet medical necessity for the procedure Echo Stress Test, the doppler color flow portion, per payer guidelines. If you interpret the policy differently or have other information that shows the patient meets the criteria for medical necessity, please let us know.    For your reference, here is the policy link and the diagnosis codes that meet medical necessity: [email or web address]    Diagnosis Codes That Meet Medical Necessity  A40.0 -hyphen A40.9 Streptococcal sepsis  A41.01 -hyphen A41.02 Sepsis due to staphylococcus aureus  A41.1 -hyphen A41.2 Sepsis due to other specified and unspecified staphylococcus  A41.3 Sepsis due to Hemophilus influenzae  A41.4 Sepsis due to anaerobes  A41.50 Gram-hyphennegative sepsis, unspecified  A41.51 Sepsis due to Escherichia coli [E. 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  I05.0 -hyphen I05.9 Diseases of mitral valve  I06.0 -hyphen I06.9 Diseases of aortic valve  I07.0 -hyphen I07.9 Rheumatic tricuspid valve diseases  I08.0 Rheumatic disorders of both mitral and aortic valves  I09.0 -hyphen I09.89 Other rheumatic heart disease  I20.0 -hyphen I20.9 Angina pectoris  I21.01 -hyphen I22.9 Acute myocardial  infarction  I21.A1 Myocardial infarction type 2  I21.A9 Other myocardial infarction type  I25.3 Aneurysm of heart  I26.09 Acute cor pulmonale  I27.0 -hyphen I27.2 Primary and other secondary pulmonary hypertension  I31.3 Pericardial effusion (noninflammatory)  I31.4 Cardiac tamponade  I33.0 -hyphen I33.9 Acute and subacute endocarditis  I34.0 -hyphen 34.9 Nonrheumatic mitral valve disorders [valve regurgitation]  I35.0 -hyphen I35.9 Nonrheumatic aortic valve disorders [valve regurgitation]  I36.0 -hyphen I36.9 Nonrheumatic tricuspid valve disorders [valve regurgitation]  I37.0 -hyphen I37.9 Nonrheumatic pulmonary valve disorders  I38 -hyphen I39 Endocarditis and heart valve disorders  I40.0 -hyphen I40.9 Acute myocarditis  I42.0 -hyphen I43 Cardiomyopathy  I48.0 -hyphen I48.92 Atrial fibrillation and flutter  I50.0 -hyphen I50.9 Heart failure  I51.0 Cardiac septal defect, acquired  I51.1 Rupture of chordae tendineae, not elsewhere classified  I51.2 Rupture of papillary muscle, not elsewhere classified  I51.4 Myocarditis, unspecified  I51.7 Cardiomegaly  I51.81 Takotsubo syndrome  I51.89 Other ill-hyphendefined heart diseases  I71.00 -hyphen I71.9 Aortic aneurysm and dissection  I95.0 -hyphen I95.9 Hypotension  J81.0 Acute pulmonary edema  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]  O24.011 -hyphen O24.019, O24.111 -hyphen O24.119  O24.311 -hyphen O24.319, O24.811 -hyphen O24.819  O24.911 -hyphen O24.919  Diabetes mellitus in pregnancy  O33.6xx+ Maternal care for disproportion due to hydrocephalic fetus  O35.0xx+ Maternal care for (suspected) central nervous system malformation in fetus  O35.1xx+ Maternal care for (suspected) chromosomal abnormality in fetus  O35.2xx+ Maternal care for (suspected) hereditary disease in fetus  O35.3xx+ Maternal care for (suspected) damage to fetus from viral disease in mother  O35.4xx+ Maternal care for (suspected) damage to fetus from alcohol  O35.5xx+ Maternal care for (suspected) damage  to fetus by drugs  O35.8xx+ Maternal care for other (suspected) fetal abnormality and damage  O35.9xx+ Maternal care for (suspected) fetal abnormality and damage, unspecified  O36.0110 -hyphen O36.0999 Maternal care for rhesus isoimmunizations  O36.1110 -hyphen O36.1999 Maternal care for other isoimmunization  O40.1xx+ -hyphen O40.9xx+ Polyhydramnios  O43.011 -hyphen O43.019 Fetomaternal placental transfusion syndrome  O76 Abnormality in fetal heart rate and rhythm complicating labor and delivery  O98.511 -hyphen O98.519 Other viral diseases complicating pregnancy  O98.811 -hyphen O98.819 Other maternal infectious and parasitic diseases complicating pregnancy, childbirth and the puerperium  O98.911 -hyphen O98.919 Unspecified maternal infectious and parasitic diseases complicating pregnancy  O99.411 -hyphen O99.43 Diseases of the circulatory system complicating pregnancy, childbirth and puerperium  P02.3  (suspected to be) affected by placental transfusion syndrome  P03.810  affected by abnormality in fetal (intrauterine) heart rate or rhythm before the onset of labor  P03.819  affected by abnormality in fetal (intrauterine) heart rate or rhythm, unspecified as to time of onset  P04.11 -hyphen P04.19 [place] affected by noxious substances transmitted via placenta or breast milk  P04.1A  affected by maternal use of anxiolytics  P04.3 [place] affected by maternal use of alcohol  P04.40 -hyphen P04.49 Davis affected by maternal use of drugs of addiction  P29.30 -hyphen P29.38 Persistent fetal circulation  P70.0 -hyphen P70.1 Syndrome of infant of mother with diabetes/gestational diabetes  P83.2 Hydrops fetalis not due to hemolytic disease  Q20.0 -hyphen Q21.9 Congenital malformations of cardiac chambers, connections and septa  Q22.0 -hyphen Q22.3 Congenital malformations of pulmonary valves  Q22.4 Congenital tricuspid stenosis  Q22.5 Ebstein's anomaly  Q23.0 Congenital stenosis of aortic  valve  Q23.1 Congenital insufficiency of aortic valve  Q23.2 Congenital mitral stenosis  Q23.3 Congenital mitral insufficiency  Q23.4 Hypoplastic left heart syndrome  Q24.2 Cor triatriatum  Q24.3 Pulmonary infundibular stenosis  Q24.4 Congenital subaortic stenosis  Q24.8 Other specified congenital malformations of heart  Q26.0 -hyphen Q26.9 Congenital malformations of great veins  Q86.0 Fetal alcohol syndrome (dysmorphic)  Q87.40 -hyphen Q87.43 Marfan's syndrome  R01.1 Cardiac murmur, unspecified  R06.0 Dyspnea  R06.02 Shortness of breath  T82.01x+ -hyphen T82.09x+ Mechanical complication of heart valve prosthesis  T82.211+ -hyphen T82.218+ Mechanical complication of coronary bypass graft  T82.6xx+, T82.7xx+  Infection and inflammatory reaction due to cardiac valve prosthesis, vascular devices, implants, and grafts  T82.817+ -hyphen T82.9xx+ Other complications due to heart valve prosthesis  T86.20 -hyphen T86. 298 Complications of heart transplant  Z87.74 Personal history of (corrected) congenital malformations of heart and circulatory system  Z95.1 Presence of aortocoronary bypass graft  Z95.2 Presence of prosthetic heart valve  Z95.3 Presence of xenogenic heart valve  ICD-nittnj62 codes not covered for indications listed in the CPB:  I47.2 Ventricular tachycardia  The above policy is based on the followin    Please note: medical policies are not authorizations and are set by the payer. There is no official process for appealing these policies pre-service. If you disagree with the policy, an appeal for medical necessity can be submitted after claim denial.    Thank you,    Celena  [employer] [place]

## 2021-06-22 NOTE — TELEPHONE ENCOUNTER
Please review with patient, insurance denying coverage for stress echo    Please advise cardiology visit with Dr Burnett, follow up afterwards, may be VV

## 2021-06-23 NOTE — TELEPHONE ENCOUNTER
Called and spoke with patient.   Patient agreeable to see cardiology.     Routing to provider to place appropriate referral.     ESTEFANY PeñaN, RN  [place] [facility]

## 2021-06-23 NOTE — TELEPHONE ENCOUNTER
Called and left message for patient.  Please let him know number to call to schedule with [facility] is [phone].  Dr Burnett.       Jyoti Sim

## 2021-07-13 ENCOUNTER — MYC MEDICAL ADVICE (OUTPATIENT)
Dept: FAMILY MEDICINE | Facility: CLINIC | Age: 56
End: 2021-07-13

## 2021-07-13 DIAGNOSIS — Z12.11 SCREEN FOR COLON CANCER: ICD-10-CM

## 2021-07-13 DIAGNOSIS — K63.5 POLYP OF COLON, UNSPECIFIED PART OF COLON, UNSPECIFIED TYPE: Primary | ICD-10-CM

## 2021-07-13 NOTE — TELEPHONE ENCOUNTER
Colonoscopy referral completed, please keep 8/2/2021 appt, await their recommendations on further testing

## 2021-07-13 NOTE — TELEPHONE ENCOUNTER
Please see my chart message below     Please review and advise     Thank you     Laura Luna RN, BSN  [place] [facility]

## 2021-07-16 DIAGNOSIS — Z11.59 ENCOUNTER FOR SCREENING FOR OTHER VIRAL DISEASES: ICD-10-CM

## 2021-08-02 ENCOUNTER — OFFICE VISIT (OUTPATIENT)
Dept: CARDIOLOGY | Facility: CLINIC | Age: 56
End: 2021-08-02
Attending: FAMILY MEDICINE
Payer: COMMERCIAL

## 2021-08-02 VITALS
DIASTOLIC BLOOD PRESSURE: 78 MMHG | HEART RATE: 70 BPM | WEIGHT: 223 LBS | OXYGEN SATURATION: 98 % | BODY MASS INDEX: 32.93 KG/M2 | SYSTOLIC BLOOD PRESSURE: 111 MMHG

## 2021-08-02 DIAGNOSIS — Z79.899 MEDICATION MANAGEMENT: ICD-10-CM

## 2021-08-02 DIAGNOSIS — I25.10 CORONARY ARTERY DISEASE INVOLVING NATIVE CORONARY ARTERY WITHOUT ANGINA PECTORIS, UNSPECIFIED WHETHER NATIVE OR TRANSPLANTED HEART: Primary | ICD-10-CM

## 2021-08-02 DIAGNOSIS — E78.5 HYPERLIPIDEMIA LDL GOAL <70: ICD-10-CM

## 2021-08-02 PROCEDURE — 99213 OFFICE O/P EST LOW 20 MIN: CPT | Performed by: PHYSICIAN ASSISTANT

## 2021-08-02 NOTE — PATIENT INSTRUCTIONS
"Thank you for your [facility] visit today. Your provider has recommended the following:    Medication Changes:  No medication changes today  Recommendations:   please call us with any symptoms or concerns.  Follow-up:  See Dr. Burnett in one year. Please call us sooner with any issues.    Reminder:  Please bring in all current medications, over the counter supplements and vitamin bottles to your next appointment.  Important \"[facility]\" telephone numbers for your reference:  Cardiology Scheduling - [phone]  Cardiology Clinic RN-  [phone]     [facility]    "

## 2021-08-02 NOTE — PROGRESS NOTES
Cardiology Progress Note    Patient seen today in follow up of: coronary artery disease  Primary cardiologist: Dr. Burnett    HPI:  Ernesto Soliman is a very pleasant 55 year old male with a history of:  1.  Coronary artery disease s/p stenting of a mid LAD with 2 REMBERTO in June of 2015 with good results. The first diagonal and first and second septal perforaters were jailed and there was a smooth 35% stenosis in the distal LAD. On aspirin and statin. LV function is normal.   2.  Hyperlipidemia. Maintained on lipitor 40 mg daily.     Ernesto is here today for annual cardiology follow up. Fortunately, he continues to do well from a cardiovascular perspective. He has no complaints today and specifically denies chest pain or shortness of breath. He is trying to exercise regularly. Yesterday he walked two miles without difficulty. He recently saw his PCP Dr. Whiteside who ordered a stress echo to follow up on his coronary disease which was denied by his insurance.     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     ASSESSMENT/PLAN:  Ernesto Soliman is a pleasant 55 year old male with a history of CAD s/p stenting of the mid LAD in June of 2015 and hyperlipidemia.     At this point, he seems to be doing well from a cardiovascular perspective. He has no anginal symptoms today. He is staying active. He is on appropriate medications including aspirin and statin. He did have a lipid profile in June which showed a LDL of 77. TGs were mildly elevated. He had issues on 80 mg of Lipitor. We discussed as had Dr. Whiteside lifestyle modifications to hopefully get those numbers down. If LDL is trending up in the future, I would consider switching him to Crestor.     We talked about signs/symptoms that would warrant further investigation in the future, and he will alert us if he develops any of those. I will have him return to see   Lex in one year but he'll contact us sooner with any concerns.     Orders this Visit:  Orders Placed This Encounter   Procedures     Follow-Up with Cardiologist     No orders of the defined types were placed in this encounter.    There are no discontinued medications.    CURRENT MEDICATIONS:  Current Outpatient Medications   Medication Sig Dispense Refill     aspirin (ASA) 81 MG tablet Take 1 tablet (81 mg) by mouth daily       atorvastatin (LIPITOR) 40 MG tablet Take 1 tablet (40 mg) by mouth daily You need to make an appointment for refills. [phone] 90 tablet 3     co-enzyme Q-10 100 MG CAPS capsule Take 200 mg by mouth daily       zinc gluconate 50 MG tablet Take 50 mg by mouth daily       ALLERGIES  No Known Allergies  PAST MEDICAL HISTORY:  Past Medical History:   Diagnosis Date     ADD (attention deficit hyperactivity disorder, inattentive type) 1997     CAD (coronary artery disease) 06/2015    REMBERTO x 2 = LAD  EF 35-40% - Dr Roy-Geovanny     Colon polyp 04/2015    tubular adenoma - due 5 yrs     Controlled substance agreement signed 08/2016     Family history of coronary artery disease      Hyperlipidemia LDL goal <70      Medication management      Snoring     [facility] - [facility] Goltz     PAST SURGICAL HISTORY:  Past Surgical History:   Procedure Laterality Date     COLONOSCOPY  4/15    polyp x 1 6 mm - rectum - tubular adenoma - due 5 yrs     HEART CATH STENT COR W/WO PTCA  06/24/2015    REMBERTO x2 mid LAD, jailed diagonal     STRESS ECHO ([facility])  6/15    abnormal - followed by abnormal angiogram - LAD occlusion     VASECTOMY  11/11    dr perez     FAMILY HISTORY:  Family History   Problem Relation Age of Onset     Chronic Obstructive Pulmonary Disease Mother         smoker     ALS Father         smoker     Cystic Fibrosis Son      C.A.D. Maternal Uncle         stents x 3     C.A.D. Paternal Uncle         MI at 56     C.A.D. Maternal Grandfather         age 56     C.A.D. Paternal Grandfather         age  60     SOCIAL HISTORY:  Social History     Socioeconomic History     Marital status:      Spouse name: Nayely     Number of children: 3     Years of education: 16     Highest education level: None   Occupational History     Employer: [employer]   Tobacco Use     Smoking status: Never Smoker     Smokeless tobacco: Never Used   Substance and Sexual Activity     Alcohol use: Yes     Alcohol/week: 0.0 - 1.0 standard drinks     Comment: weekends 2-3 glasses wine     Drug use: No     Sexual activity: Yes     Partners: Female   Other Topics Concern      Service Not Asked     Blood Transfusions Not Asked     Caffeine Concern No     Comment: 2 cups daily, occas pop     Occupational Exposure Not Asked     Hobby Hazards Not Asked     Sleep Concern Not Asked     Stress Concern Not Asked     Weight Concern Not Asked     Special Diet Not Asked     Back Care Not Asked     Exercise Yes     Comment: 1-3/wk     Bike Helmet Not Asked     Seat Belt Yes     Self-Exams Not Asked     Parent/sibling w/ CABG, MI or angioplasty before 65F 55M? No   Social History Narrative     None     Social Determinants of Health     Financial Resource Strain:      Difficulty of Paying Living Expenses:    Food Insecurity:      Worried About Running Out of Food in the Last Year:      Ran Out of Food in the Last Year:    Transportation Needs:      Lack of Transportation (Medical):      Lack of Transportation (Non-Medical):    Physical Activity:      Days of Exercise per Week:      Minutes of Exercise per Session:    Stress:      Feeling of Stress :    Social Connections:      Frequency of Communication with Friends and Family:      Frequency of Social Gatherings with Friends and Family:      Attends Restorationist Services:      Active Member of Clubs or Organizations:      Attends Club or Organization Meetings:      Marital Status:    Intimate Partner Violence:      Fear of Current or Ex-Partner:      Emotionally Abused:      Physically Abused:       Sexually Abused:      Review of Systems:  Skin:  Negative     Eyes:  Negative    ENT:  Negative    Respiratory:  Negative    Cardiovascular:  Negative    Gastroenterology: Negative    Genitourinary:  not assessed    Musculoskeletal:  Negative    Neurologic:  Negative    Psychiatric:  Negative    Heme/Lymph/Imm:  Negative    Endocrine:  Negative       Physical Exam:  Vitals: /78   Pulse 70   Wt 101.2 kg (223 lb)   SpO2 98%   BMI 32.93 kg/m     Wt Readings from Last 4 Encounters:   08/02/21 101.2 kg (223 lb)   06/08/21 97.5 kg (215 lb)   05/20/20 94.3 kg (208 lb)   01/21/20 102.5 kg (226 lb)     GEN: well nourished, in no acute distress.  HEENT:  Pupils equal, round. Sclerae nonicteric.   NECK: carotids are quiet.   C/V:  Regular rate and rhythm, no murmur, rub or gallop.    RESP: Respirations are unlabored. Clear to auscultation bilaterally without wheezing, rales, or rhonchi.  GI: Abdomen soft, nontender.  EXTREM: no  LE edema.  NEURO: Alert and oriented, cooperative.  SKIN: Warm and dry.     Recent Lab Results:  LIPID RESULTS:  Lab Results   Component Value Date    CHOL 172 06/08/2021    HDL 53 06/08/2021    LDL 77 06/08/2021    TRIG 208 (H) 06/08/2021    CHOLHDLRATIO 3.6 07/22/2015     LIVER ENZYME RESULTS:  Lab Results   Component Value Date    AST 36 06/08/2021    ALT 64 06/08/2021     CBC RESULTS:  Lab Results   Component Value Date    WBC 6.7 06/08/2021    RBC 5.43 06/08/2021    HGB 15.9 06/08/2021    HCT 45.8 06/08/2021    MCV 84 06/08/2021    MCH 29.3 06/08/2021    MCHC 34.7 06/08/2021    RDW 12.7 06/08/2021     06/08/2021     BMP RESULTS:  Lab Results   Component Value Date     06/08/2021    POTASSIUM 5.2 06/08/2021    CHLORIDE 107 06/08/2021    CO2 28 06/08/2021    ANIONGAP 2 (L) 06/08/2021    GLC 90 06/08/2021    BUN 17 06/08/2021    CR 0.92 06/08/2021    GFRESTIMATED >90 06/08/2021    GFRESTBLACK >90 06/08/2021    ALYSON 9.1 06/08/2021      A1C RESULTS:  Lab Results   Component  Value Date    A1C 5.2 03/02/2016     INR RESULTS:  Lab Results   Component Value Date    INR 0.97 06/24/2015       Lenka Sánchez PA-C  [facility]

## 2021-08-02 NOTE — LETTER
8/2/2021    Andrews Whiteside MD  [address] [facility] [address]    RE: Ernesto Soliman       Dear Colleague,    I had the pleasure of seeing Ernesto Soliman in the [facility].      Cardiology Progress Note    Patient seen today in follow up of: coronary artery disease  Primary cardiologist: Dr. Burnett    HPI:  Ernesto Soliman is a very pleasant 55 year old male with a history of:  1.  Coronary artery disease s/p stenting of a mid LAD with 2 REMBERTO in June of 2015 with good results. The first diagonal and first and second septal perforaters were jailed and there was a smooth 35% stenosis in the distal LAD. On aspirin and statin. LV function is normal.   2.  Hyperlipidemia. Maintained on lipitor 40 mg daily.     Ernesto is here today for annual cardiology follow up. Fortunately, he continues to do well from a cardiovascular perspective. He has no complaints today and specifically denies chest pain or shortness of breath. He is trying to exercise regularly. Yesterday he walked two miles without difficulty. He recently saw his PCP Dr. Whiteside who ordered a stress echo to follow up on his coronary disease which was denied by his insurance.     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     ASSESSMENT/PLAN:  Ernesto Soliman is a pleasant 55 year old male with a history of CAD s/p stenting of the mid LAD in June of 2015 and hyperlipidemia.     At this point, he seems to be doing well from a cardiovascular perspective. He has no anginal symptoms today. He is staying active. He is on appropriate medications including aspirin and statin. He did have a lipid profile in June which showed a LDL of 77. TGs were mildly elevated. He had issues on 80 mg of Lipitor. We discussed as had Dr. Whiteside lifestyle modifications to hopefully get those numbers down. If LDL is  trending up in the future, I would consider switching him to Crestor.     We talked about signs/symptoms that would warrant further investigation in the future, and he will alert us if he develops any of those. I will have him return to see Dr. Burnett in one year but he'll contact us sooner with any concerns.     Orders this Visit:  Orders Placed This Encounter   Procedures     Follow-Up with Cardiologist     No orders of the defined types were placed in this encounter.    There are no discontinued medications.    CURRENT MEDICATIONS:  Current Outpatient Medications   Medication Sig Dispense Refill     aspirin (ASA) 81 MG tablet Take 1 tablet (81 mg) by mouth daily       atorvastatin (LIPITOR) 40 MG tablet Take 1 tablet (40 mg) by mouth daily You need to make an appointment for refills. [phone] 90 tablet 3     co-enzyme Q-10 100 MG CAPS capsule Take 200 mg by mouth daily       zinc gluconate 50 MG tablet Take 50 mg by mouth daily       ALLERGIES  No Known Allergies  PAST MEDICAL HISTORY:  Past Medical History:   Diagnosis Date     ADD (attention deficit hyperactivity disorder, inattentive type) 1997     CAD (coronary artery disease) 06/2015    REMBERTO x 2 = LAD  EF 35-40% - Dr Cheney     Colon polyp 04/2015    tubular adenoma - due 5 yrs     Controlled substance agreement signed 08/2016     Family history of coronary artery disease      Hyperlipidemia LDL goal <70      Medication management      Snoring     [facility] - Bennett Goltz     PAST SURGICAL HISTORY:  Past Surgical History:   Procedure Laterality Date     COLONOSCOPY  4/15    polyp x 1 6 mm - rectum - tubular adenoma - due 5 yrs     HEART CATH STENT COR W/WO PTCA  06/24/2015    REMBERTO x2 mid LAD, jailed diagonal     STRESS ECHO ([facility])  6/15    abnormal - followed by abnormal angiogram - LAD occlusion     VASECTOMY  11/11    dr perez     FAMILY HISTORY:  Family History   Problem Relation Age of Onset     Chronic Obstructive Pulmonary Disease Mother          smoker     ALS Father         smoker     Cystic Fibrosis Son      C.A.D. Maternal Uncle         stents x 3     C.A.D. Paternal Uncle         MI at 56     C.A.D. Maternal Grandfather         age 56     C.A.D. Paternal Grandfather         age 60     SOCIAL HISTORY:  Social History     Socioeconomic History     Marital status:      Spouse name: Nayely     Number of children: 3     Years of education: 16     Highest education level: None   Occupational History     Employer: [employer]   Tobacco Use     Smoking status: Never Smoker     Smokeless tobacco: Never Used   Substance and Sexual Activity     Alcohol use: Yes     Alcohol/week: 0.0 - 1.0 standard drinks     Comment: weekends 2-3 glasses wine     Drug use: No     Sexual activity: Yes     Partners: Female   Other Topics Concern      Service Not Asked     Blood Transfusions Not Asked     Caffeine Concern No     Comment: 2 cups daily, occas pop     Occupational Exposure Not Asked     Hobby Hazards Not Asked     Sleep Concern Not Asked     Stress Concern Not Asked     Weight Concern Not Asked     Special Diet Not Asked     Back Care Not Asked     Exercise Yes     Comment: 1-3/wk     Bike Helmet Not Asked     Seat Belt Yes     Self-Exams Not Asked     Parent/sibling w/ CABG, MI or angioplasty before 65F 55M? No   Social History Narrative     None     Social Determinants of Health     Financial Resource Strain:      Difficulty of Paying Living Expenses:    Food Insecurity:      Worried About Running Out of Food in the Last Year:      Ran Out of Food in the Last Year:    Transportation Needs:      Lack of Transportation (Medical):      Lack of Transportation (Non-Medical):    Physical Activity:      Days of Exercise per Week:      Minutes of Exercise per Session:    Stress:      Feeling of Stress :    Social Connections:      Frequency of Communication with Friends and Family:      Frequency of Social Gatherings with Friends and Family:      Attends  Adventism Services:      Active Member of Clubs or Organizations:      Attends Club or Organization Meetings:      Marital Status:    Intimate Partner Violence:      Fear of Current or Ex-Partner:      Emotionally Abused:      Physically Abused:      Sexually Abused:      Review of Systems:  Skin:  Negative     Eyes:  Negative    ENT:  Negative    Respiratory:  Negative    Cardiovascular:  Negative    Gastroenterology: Negative    Genitourinary:  not assessed    Musculoskeletal:  Negative    Neurologic:  Negative    Psychiatric:  Negative    Heme/Lymph/Imm:  Negative    Endocrine:  Negative       Physical Exam:  Vitals: /78   Pulse 70   Wt 101.2 kg (223 lb)   SpO2 98%   BMI 32.93 kg/m     Wt Readings from Last 4 Encounters:   08/02/21 101.2 kg (223 lb)   06/08/21 97.5 kg (215 lb)   05/20/20 94.3 kg (208 lb)   01/21/20 102.5 kg (226 lb)     GEN: well nourished, in no acute distress.  HEENT:  Pupils equal, round. Sclerae nonicteric.   NECK: carotids are quiet.   C/V:  Regular rate and rhythm, no murmur, rub or gallop.    RESP: Respirations are unlabored. Clear to auscultation bilaterally without wheezing, rales, or rhonchi.  GI: Abdomen soft, nontender.  EXTREM: no  LE edema.  NEURO: Alert and oriented, cooperative.  SKIN: Warm and dry.     Recent Lab Results:  LIPID RESULTS:  Lab Results   Component Value Date    CHOL 172 06/08/2021    HDL 53 06/08/2021    LDL 77 06/08/2021    TRIG 208 (H) 06/08/2021    CHOLHDLRATIO 3.6 07/22/2015     LIVER ENZYME RESULTS:  Lab Results   Component Value Date    AST 36 06/08/2021    ALT 64 06/08/2021     CBC RESULTS:  Lab Results   Component Value Date    WBC 6.7 06/08/2021    RBC 5.43 06/08/2021    HGB 15.9 06/08/2021    HCT 45.8 06/08/2021    MCV 84 06/08/2021    MCH 29.3 06/08/2021    MCHC 34.7 06/08/2021    RDW 12.7 06/08/2021     06/08/2021     BMP RESULTS:  Lab Results   Component Value Date     06/08/2021    POTASSIUM 5.2 06/08/2021    CHLORIDE 107  06/08/2021    CO2 28 06/08/2021    ANIONGAP 2 (L) 06/08/2021    GLC 90 06/08/2021    BUN 17 06/08/2021    CR 0.92 06/08/2021    GFRESTIMATED >90 06/08/2021    GFRESTBLACK >90 06/08/2021    ALYSON 9.1 06/08/2021      A1C RESULTS:  Lab Results   Component Value Date    A1C 5.2 03/02/2016     INR RESULTS:  Lab Results   Component Value Date    INR 0.97 06/24/2015       Lenka Sánchez PA-C  [facility]      Thank you for allowing me to participate in the care of your patient.      Sincerely,     Lenka Sánchez PA-C     [facility]  cc:   Andrews Whiteside MD  [address] [place] [address]

## 2021-08-05 ENCOUNTER — MYC MEDICAL ADVICE (OUTPATIENT)
Dept: FAMILY MEDICINE | Facility: CLINIC | Age: 56
End: 2021-08-05

## 2021-08-05 DIAGNOSIS — N52.9 ED (ERECTILE DYSFUNCTION): Primary | ICD-10-CM

## 2021-08-06 RX ORDER — SILDENAFIL CITRATE 20 MG/1
20 TABLET ORAL DAILY
Qty: 30 TABLET | Refills: 1 | Status: SHIPPED | OUTPATIENT
Start: 2021-08-06 | End: 2021-08-12

## 2021-08-06 NOTE — TELEPHONE ENCOUNTER
Routing to PCP to review and advise. Stress echo not covered by insurance, recently seen cardiology    Seng KATZ RN   [facility]

## 2021-08-06 NOTE — TELEPHONE ENCOUNTER
Ok for trial of sildenafil 20 mg (1-3) tabs 30-60 minutes before exercise, no use with nitroglycerin, alpha blockers, #30, r x 1

## 2021-08-06 NOTE — TELEPHONE ENCOUNTER
Called and spoke with patient.     Prescription sent in to pharmacy. Patient stated an understanding and agreed with plan.     Germaine Small RN  [facility]

## 2021-08-09 ENCOUNTER — TELEPHONE (OUTPATIENT)
Dept: FAMILY MEDICINE | Facility: CLINIC | Age: 56
End: 2021-08-09

## 2021-08-09 DIAGNOSIS — N52.9 ED (ERECTILE DYSFUNCTION): ICD-10-CM

## 2021-08-09 NOTE — TELEPHONE ENCOUNTER
Reason for Call:  Form, our goal is to have forms completed with 72 hours, however, some forms may require a visit or additional information.    Type of letter, form or note:  medical    Who is the form from?: [facility] (if other please explain)    Where did the form come from: form was faxed in    What clinic location was the form placed at?: [facility]    Where the form was placed: Dr Whiteside Box/Folder    What number is listed as a contact on the form?: [phone]       Additional comments:     Call taken on 8/9/2021 at 7:23 AM by Rebeka De Guzman

## 2021-08-09 NOTE — TELEPHONE ENCOUNTER
PA Initiation    Medication: sildenafil (REVATIO) 20 MG tablet   Insurance Company: Other (see comments)  Pharmacy Filling the Rx: [id number] [place], [place] - [address]  Filling Pharmacy Phone: [phone]  Filling Pharmacy Fax: [phone]  Start Date: 8/9/2021

## 2021-08-09 NOTE — TELEPHONE ENCOUNTER
Prior Authorization Retail Medication Request    Medication/Dose: Sildenafil 20 mg tabs   ICD code (if different than what is on RX):    Previously Tried and Failed:    Rationale:      Insurance Name:  In chart  Insurance ID:        Pharmacy Information (if different than what is on RX)  Name:  [facility]  Phone:  [phone]    Plan does not cover, please advise to change medication or start a ELSIE Sim

## 2021-08-10 NOTE — TELEPHONE ENCOUNTER
PRIOR AUTHORIZATION DENIED    Medication: sildenafil (REVATIO) 20 MG tablet--DENIED    Denial Date: 8/10/2021    Denial Rational: Patient is not using medication to treat pulmonary arterial hypertension (PAH) or secondary Raynaud's phenomenon     Appeal Information:

## 2021-08-12 RX ORDER — SILDENAFIL CITRATE 20 MG/1
20 TABLET ORAL DAILY
Qty: 30 TABLET | Refills: 1 | Status: SHIPPED | OUTPATIENT
Start: 2021-08-12 | End: 2021-08-12

## 2021-08-12 RX ORDER — SILDENAFIL CITRATE 20 MG/1
20 TABLET ORAL DAILY
Qty: 30 TABLET | Refills: 1 | Status: SHIPPED | OUTPATIENT
Start: 2021-08-12 | End: 2022-08-02

## 2021-08-12 NOTE — TELEPHONE ENCOUNTER
Called # [phone]     Writer sent Rx to [facility], Pt noted will use goodrx coupon. Writer had to send second time due to unchecking PA box on Rx.        Seng Bee RN   [facility]

## 2021-09-22 ENCOUNTER — HOSPITAL ENCOUNTER (OUTPATIENT)
Facility: CLINIC | Age: 56
Discharge: HOME | End: 2021-09-22
Attending: INTERNAL MEDICINE | Admitting: INTERNAL MEDICINE
Payer: COMMERCIAL

## 2021-09-22 VITALS
OXYGEN SATURATION: 94 % | HEART RATE: 58 BPM | TEMPERATURE: 98.2 F | SYSTOLIC BLOOD PRESSURE: 94 MMHG | DIASTOLIC BLOOD PRESSURE: 63 MMHG | RESPIRATION RATE: 16 BRPM

## 2021-09-22 LAB — COLONOSCOPY: NORMAL

## 2021-09-22 PROCEDURE — G0500 MOD SEDAT ENDO SERVICE >5YRS: HCPCS | Performed by: INTERNAL MEDICINE

## 2021-09-22 PROCEDURE — 88305 TISSUE EXAM BY PATHOLOGIST: CPT | Mod: TC | Performed by: INTERNAL MEDICINE

## 2021-09-22 PROCEDURE — 250N000011 HC RX IP 250 OP 636: Performed by: INTERNAL MEDICINE

## 2021-09-22 PROCEDURE — 45380 COLONOSCOPY AND BIOPSY: CPT | Performed by: INTERNAL MEDICINE

## 2021-09-22 RX ORDER — ONDANSETRON 2 MG/ML
4 INJECTION INTRAMUSCULAR; INTRAVENOUS
Status: DISCONTINUED | OUTPATIENT
Start: 2021-09-22 | End: 2021-09-22 | Stop reason: HOSPADM

## 2021-09-22 RX ORDER — ATROPINE SULFATE 0.4 MG/ML
0.4 AMPUL (ML) INJECTION
Status: DISCONTINUED | OUTPATIENT
Start: 2021-09-22 | End: 2021-09-22 | Stop reason: HOSPADM

## 2021-09-22 RX ORDER — NALOXONE HYDROCHLORIDE 0.4 MG/ML
0.4 INJECTION, SOLUTION INTRAMUSCULAR; INTRAVENOUS; SUBCUTANEOUS
Status: DISCONTINUED | OUTPATIENT
Start: 2021-09-22 | End: 2021-09-22 | Stop reason: HOSPADM

## 2021-09-22 RX ORDER — ONDANSETRON 2 MG/ML
4 INJECTION INTRAMUSCULAR; INTRAVENOUS EVERY 6 HOURS PRN
Status: DISCONTINUED | OUTPATIENT
Start: 2021-09-22 | End: 2021-09-22 | Stop reason: HOSPADM

## 2021-09-22 RX ORDER — NALOXONE HYDROCHLORIDE 0.4 MG/ML
0.2 INJECTION, SOLUTION INTRAMUSCULAR; INTRAVENOUS; SUBCUTANEOUS
Status: DISCONTINUED | OUTPATIENT
Start: 2021-09-22 | End: 2021-09-22 | Stop reason: HOSPADM

## 2021-09-22 RX ORDER — FLUMAZENIL 0.1 MG/ML
0.2 INJECTION, SOLUTION INTRAVENOUS
Status: DISCONTINUED | OUTPATIENT
Start: 2021-09-22 | End: 2021-09-22 | Stop reason: HOSPADM

## 2021-09-22 RX ORDER — EPINEPHRINE 1 MG/ML
0.1 INJECTION, SOLUTION INTRAMUSCULAR; SUBCUTANEOUS
Status: DISCONTINUED | OUTPATIENT
Start: 2021-09-22 | End: 2021-09-22 | Stop reason: HOSPADM

## 2021-09-22 RX ORDER — FENTANYL CITRATE 50 UG/ML
50-100 INJECTION, SOLUTION INTRAMUSCULAR; INTRAVENOUS
Status: COMPLETED | OUTPATIENT
Start: 2021-09-22 | End: 2021-09-22

## 2021-09-22 RX ORDER — PROCHLORPERAZINE MALEATE 10 MG
10 TABLET ORAL EVERY 6 HOURS PRN
Status: DISCONTINUED | OUTPATIENT
Start: 2021-09-22 | End: 2021-09-22 | Stop reason: HOSPADM

## 2021-09-22 RX ORDER — FENTANYL CITRATE 50 UG/ML
25-50 INJECTION, SOLUTION INTRAMUSCULAR; INTRAVENOUS
Status: DISCONTINUED | OUTPATIENT
Start: 2021-09-22 | End: 2021-09-22 | Stop reason: HOSPADM

## 2021-09-22 RX ORDER — SIMETHICONE 40MG/0.6ML
133 SUSPENSION, DROPS(FINAL DOSAGE FORM)(ML) ORAL
Status: DISCONTINUED | OUTPATIENT
Start: 2021-09-22 | End: 2021-09-22 | Stop reason: HOSPADM

## 2021-09-22 RX ORDER — LIDOCAINE 40 MG/G
CREAM TOPICAL
Status: DISCONTINUED | OUTPATIENT
Start: 2021-09-22 | End: 2021-09-22 | Stop reason: HOSPADM

## 2021-09-22 RX ORDER — ONDANSETRON 4 MG/1
4 TABLET, ORALLY DISINTEGRATING ORAL EVERY 6 HOURS PRN
Status: DISCONTINUED | OUTPATIENT
Start: 2021-09-22 | End: 2021-09-22 | Stop reason: HOSPADM

## 2021-09-22 RX ADMIN — FENTANYL CITRATE 100 MCG: 50 INJECTION, SOLUTION INTRAMUSCULAR; INTRAVENOUS at 10:40

## 2021-09-22 RX ADMIN — MIDAZOLAM 2 MG: 1 INJECTION INTRAMUSCULAR; INTRAVENOUS at 10:40

## 2021-09-22 NOTE — LETTER
September 8, 2021      Ernesto Soliman  [address] [facility] [address]        Dear Ernesto,     Please be aware that coverage of these services is subject to the terms and limitations of your health insurance plan.  Call member services at your health plan with any benefit or coverage questions.    Thank you for choosing [facility]. You are scheduled for the following service(s):    Date:  Wednesday September 22, 2021            Procedure:  COLONOSCOPY  Doctor:        Andrea Horta MD   Arrival Time:  09:30am *Enter and check in at the Main Hospital Entrance*  Procedure Time:  10:00am      Location:   [facility]        Endoscopy Department, First Floor         [address] [place], [place] [address]      [phone] or [phone] ([facility]) to reschedule        MIRALAX -GATORADE  PREP  Colonoscopy is the most accurate test to detect colon polyps and colon cancer; and the only test where polyps can be removed. During this procedure, a doctor examines the lining of your large intestine and rectum through a flexible tube.   Transportation  You must arrange for a ride for the day of your procedure with a responsible adult. A taxi , Uber, etc, is not an option unless you are accompanied by a responsible adult. If you fail to arrange transportation with a responsible adult, your procedure will be cancelled and rescheduled.    Purchase the  following supplies at your local pharmacy:  - 2 (two) bisacodyl tablets: each tablet contains 5 mg.  (Dulcolax  laxative NOT Dulcolax  stool softener)   - 1 (one) 8.3 oz bottle of Polyethylene Glycol (PEG) 3350 Powder   (MiraLAX , Smooth LAX , ClearLAX  or equivalent)  - 64 oz Gatorade    Regular Gatorade, Gatorade G2 , Powerade , Powerade Zero  or Pedialyte  is acceptable. Red colored flavors are not allowed; all other colors (yellow, green, orange, purple and blue) are okay. It is also okay to buy two 2.12 oz  packets of powdered Gatorade that can be mixed with water to a total volume of 64 oz of liquid.  - 1 (one) 10 oz bottle of Magnesium Citrate (Red colored flavors are not allowed)  It is also okay for you to use a 0.5 oz package of powdered magnesium citrate (17 g) mixed with 10 oz of water.      PREPARATION FOR COLONOSCOPY    7 days before:    Discontinue fiber supplements and medications containing iron. This includes Metamucil  and Fibercon ; and multivitamins with iron.    3 days before:    Begin a low-fiber diet. A low-fiber diet helps making the cleanout more effective.     Examples of a low-fiber diet include (but are not limited to): white bread, white rice, pasta, crackers, fish, chicken, eggs, ground beef, creamy peanut butter, cooked/steamed/boiled vegetables, canned fruit, bananas, melons, milk, plain yogurt cheese, salad dressing and other condiments.     The following are not allowed on a low-fiber diet: seeds, nuts, popcorn, bran, whole wheat, corn, quinoa, raw fruits and vegetables, berries and dried fruit, beans and lentils.    For additional details on low-fiber diet, please refer to the table on the last page.    2 days before:    Continue the low-fiber diet.     Drink at least 8 glasses of water throughout the day.     Stop eating solid foods at 11:45 pm.    1 day before:    In the morning: begin a clear liquid diet (liquids you can see through).     Examples of a clear liquid diet include: water, clear broth or bouillon, Gatorade, Pedialyte or Powerade, carbonated and non-carbonated soft drinks (Sprite , 7-Up , ginger ale), strained fruit juices without pulp (apple, white grape, white cranberry), Jell-O  and popsicles.     The following are not allowed on a clear liquid diet: red liquids, alcoholic beverages, dairy products (milk, creamer, and yogurt), protein shakes, creamy broths, juice with pulp and chewing tobacco.    At noon: take 2 (two) bisacodyl tablets     At 4 (and no later than 6pm):  start drinking the Miralax-Gatorade preparation (8.3 oz of Miralax mixed with 64 oz of Gatorade in a large pitcher). Drink 1(one) 8 oz glass every 15 minutes thereafter, until the mixture is gone.    COLON CLEANSING TIPS: drink adequate amounts of fluids before and after your colon cleansing to prevent dehydration. Stay near a toilet because you will have diarrhea. Even if you are sitting on the toilet, continue to drink the cleansing solution every 15 minutes. If you feel nauseous or vomit, rinse your mouth with water, take a 15 to 30-minute-break and then continue drinking the solution. You will be uncomfortable until the stool has flushed from your colon (in about 2 to 4 hours). You may feel chilled.    Day of your procedure  You may take all of your morning medications including blood pressure medications, blood thinners (if you have not been instructed to stop these by our office), methadone, anti-seizure medications with sips of water 3 hours prior to your procedure or earlier. Do not take insulin or vitamins prior to your procedure. Continue the clear liquid diet.       4 hours prior: drink 10 oz of magnesium citrate. It may be easier to drink it with a straw.    STOP consuming all liquids after that.     Do not take anything by mouth during this time.     Allow extra time to travel to your procedure as you may need to stop and use a restroom along the way.    You are ready for the procedure, if you followed all instructions and your stool is no longer formed, but clear or yellow liquid. If you are unsure whether your colon is clean, please call our office at [phone] before you leave for your appointment.    Bring the following to your procedure:  - Insurance Card/Photo ID.   - List of current medications including over-the-counter medications and supplements.   - Your rescue inhaler if you currently use one to control asthma.    Canceling or rescheduling your appointment:   If you must cancel or  reschedule your appointment, please call [phone] as soon as possible.      COLONOSCOPY PRE-PROCEDURE CHECKLIST    If you have diabetes, ask your regular doctor for diet and medication restrictions.  If you take an anticoagulant or anti-platelet medication (such as Coumadin , Lovenox , Pradaxa , Xarelto , Eliquis , etc.), please call your primary doctor for advice on holding this medication.  If you take aspirin you may continue to do so.  If you are or may be pregnant, please discuss the risks and benefits of this procedure with your doctor.        What happens during a colonoscopy?    Plan to spend up to two hours, starting at registration time, at the endoscopy center the day of your procedure. The colonoscopy takes an average of 15 to 30 minutes. Recovery time is about 30 minutes.      Before the exam:    You will change into a gown.    Your medical history and medication list will be reviewed with you, unless that has been done over the phone prior to the procedure.     A nurse will insert an intravenous (IV) line into your hand or arm.    The doctor will meet with you and will give you a consent form to sign.  During the exam:     Medicine will be given through the IV line to help you relax.     Your heart rate and oxygen levels will be monitored. If your blood pressure is low, you may be given fluids through the IV line.     The doctor will insert a flexible hollow tube, called a colonoscope, into your rectum. The scope will be advanced slowly through the large intestine (colon).    You may have a feeling of fullness or pressure.     If an abnormal tissue or a polyp is found, the doctor may remove it through the endoscope for closer examination, or biopsy. Tissue removal is painless    After the exam:           Any tissue samples removed during the exam will be sent to a lab for evaluation. It may take 5-7 working days for you to be notified of the results.     A nurse will provide you with complete  discharge instructions before you leave the endoscopy center. Be sure to ask the nurse for specific instructions if you take blood thinners such as Aspirin, Coumadin or Plavix.     The doctor will prepare a full report for you and for the physician who referred you for the procedure.     Your doctor will talk with you about the initial results of your exam.      Medication given during the exam will prohibit you from driving for the rest of the day.     Following the exam, you may resume your normal diet. Your first meal should be light, no greasy foods. Avoid alcohol until the next day.     You may resume your regular activities the day after the procedure.         LOW-FIBER DIET    Foods RECOMMENDED Foods to AVOID   Breads, Cereal, Rice and Pasta:   White bread, rolls, biscuits, croissant and buddy toast.   Waffles, [place] toast and pancakes.   White rice, noodles, pasta, macaroni and peeled cooked potatoes.   Plain crackers and saltines.   Cooked cereals: farina, cream of rice.   Cold cereals: Puffed Rice , Rice Krispies , Corn Flakes  and Special K    Breads, Cereal, Rice and Pasta:   Breads or rolls with nuts, seeds or fruit.   Whole wheat, pumpernickel, rye breads and cornbread.   Potatoes with skin, brown or wild rice, and kasha (buckwheat).     Vegetables:   Tender cooked and canned vegetables without seeds: carrots, asparagus tips, green or wax beans, pumpkin, spinach, lima beans. Vegetables:   Raw or steamed vegetables.   Vegetables with seeds.   Sauerkraut.   Winter squash, peas, broccoli, Brussel sprouts, cabbage, onions, cauliflower, baked beans, peas and corn.   Fruits:   Strained fruit juice.   Canned fruit, except pineapple.   Ripe bananas and melon. Fruits:   Prunes and prune juice.   Raw fruits.   Dried fruits: figs, dates and raisins.   Milk/Dairy:   Milk: plain or flavored.   Yogurt, custard and ice cream.   Cheese and cottage cheese Milk/Dairy:     Meat and other proteins:   ground, well-cooked  tender beef, lamb, ham, veal, pork, fish, poultry and organ meats.   Eggs.   Peanut butter without nuts. Meat and other proteins:   Tough, fibrous meats with gristle.   Dry beans, peas and lentils.   Peanut butter with nuts.   Tofu.   Fats, Snack, Sweets, Condiments and Beverages:   Margarine, butter, oils, mayonnaise, sour cream and salad dressing, plain gravy.   Sugar, hard candy, clear jelly, honey and syrup.   Spices, cooked herbs, bouillon, broth and soups made with allowed vegetable, ketchup and mustard.   Coffee, tea and carbonated drinks.   Plain cakes, cookies and pretzels.   Gelatin, plain puddings, custard, ice cream, sherbet and popsicles. Fats, Snack, Sweets, Condiments and Beverages:   Nuts, seeds and coconut.   Jam, marmalade and preserves.   Pickles, olives, relish and horseradish.   All desserts containing nuts, seeds, dried fruit and coconut; or made from whole grains or bran.   Candy made with nuts or seeds.   Popcorn.         DIRECTIONS TO THE ENDOSCOPY DEPARTMENT    From the north ([facility])  Take [address] South, exit on [address] [address]. Get into the left hand hieu, turn left (east), go one-half mile to [address] and turn left. Go north to the second stoplight, take a right on [address] and follow it to the Main Hospital entrance.    From the south ([facility] [place])  Take 35N to the [address] split and exit on [address] [address]. On [address] [address], turn left (west) to [address]. Turn right (north) on [address]. Go north to the second stoplight, take a right on [address] and follow it to the Main Hospital entrance.    From the east via 35[address] ([facility])  Take [address] south to [address] [address] exit. Turn right on [address] [address]. Go west to [address]. Turn right (north) on [address]. Go to the second stoplight, take a right on [address] to the Main Hospital entrance.    From the east via [address] ([facility])  [address]. Turn left (south) on [address] to [address], turn left (east) on [address] and follow it to the Main Hospital entrance.    From the west via [address] ([place], [place])  Take [address] east to [address]. Turn right (south) on [address] to [address], turn left (east) on [address] and follow it to the Main Hospital entrance.

## 2021-09-22 NOTE — DISCHARGE INSTRUCTIONS
Understanding Colon and Rectal Polyps     The colon has a smooth lining composed of millions of cells.     The colon (also called the large intestine) is a muscular tube that forms the last part of the digestive tract. It absorbs water and stores food waste. The colon is about 4 to 6 feet long. The rectum is the last 6 inches of the colon. The colon and rectum have a smooth lining composed of millions of cells. Changes in these cells can lead to growths in the colon that can become cancerous and should be removed.     When the Colon Lining Changes  Changes that occur in the cells that line the colon or rectum can lead to growths called polyps. Over a period of years, polyps can turn cancerous. Removing polyps early may prevent cancer from ever forming.      Polyps  Polyps are fleshy clumps of tissue that form on the lining of the colon or rectum. Small polyps are usually benign (not cancerous). However, over time, cells in a polyp can change and become cancerous. The larger a polyp grows, the more likely this is to happen. Also, certain types of polyps known as adenomatous polyps are considered premalignant. This means that they will almost always become cancerous if they re not removed.          Cancer  Almost all colorectal cancers start when polyp cells begin growing abnormally. As a cancerous tumor grows, it may involve more and more of the colon or rectum. In time, cancer can also grow beyond the colon or rectum and spread to nearby organs or to glands called lymph nodes. The cells can also travel to other parts of the body. This is known as metastasis. The earlier a cancerous tumor is removed, the better the chance of preventing its spread.        [id number] [facility], [address] [place] [address], [place] [address]. All rights reserved. This information is not intended as a substitute for professional medical care. Always follow your healthcare professional's instructions.  The patient has received a copy of  the Provation  report the doctor has written and discharge instructions have been discussed with the patient and responsible adult.  All questions were addressed and answered prior to patient discharge.

## 2021-09-22 NOTE — LETTER
August 5, 2021      Ernesto Soliman  [address] [id number]        Dear Ernesto,   \  Please be aware that coverage of these services is subject to the terms and limitations of your health insurance plan.  Call member services at your health plan with any benefit or coverage questions.    Thank you for choosing [facility]. You are scheduled for the following service(s):    Date:  8/23/2021             Procedure:  COLONOSCOPY  Doctor:        Dr. Andrea Horta   Arrival Time:  1:00  *Enter and check in at the Main Hospital Entrance*  Procedure Time:  1:30      Location:   [facility]        Endoscopy Department, First Floor         [address] [place], [place] [address]      [phone] or [phone] ([facility]) to reschedule          MIRALAX -GATORADE  PREP  Colonoscopy is the most accurate test to detect colon polyps and colon cancer; and the only test where polyps can be removed. During this procedure, a doctor examines the lining of your large intestine and rectum through a flexible tube.   Transportation  You must arrange for a ride for the day of your procedure with a responsible adult. A taxi , Uber, etc, is not an option unless you are accompanied by a responsible adult. If you fail to arrange transportation with a responsible adult, your procedure will be cancelled and rescheduled.    Purchase the  following supplies at your local pharmacy:  - 2 (two) bisacodyl tablets: each tablet contains 5 mg.  (Dulcolax  laxative NOT Dulcolax  stool softener)   - 1 (one) 8.3 oz bottle of Polyethylene Glycol (PEG) 3350 Powder   (MiraLAX , Smooth LAX , ClearLAX  or equivalent)  - 64 oz Gatorade    Regular Gatorade, Gatorade G2 , Powerade , Powerade Zero  or Pedialyte  is acceptable. Red colored flavors are not allowed; all other colors (yellow, green, orange, purple and blue) are okay. It is also okay to buy two 2.12 oz packets of powdered  Gatorade that can be mixed with water to a total volume of 64 oz of liquid.  - 1 (one) 10 oz bottle of Magnesium Citrate (Red colored flavors are not allowed)  It is also okay for you to use a 0.5 oz package of powdered magnesium citrate (17 g) mixed with 10 oz of water.      PREPARATION FOR COLONOSCOPY    7 days before:    Discontinue fiber supplements and medications containing iron. This includes Metamucil  and Fibercon ; and multivitamins with iron.    3 days before:    Begin a low-fiber diet. A low-fiber diet helps making the cleanout more effective.     Examples of a low-fiber diet include (but are not limited to): white bread, white rice, pasta, crackers, fish, chicken, eggs, ground beef, creamy peanut butter, cooked/steamed/boiled vegetables, canned fruit, bananas, melons, milk, plain yogurt cheese, salad dressing and other condiments.     The following are not allowed on a low-fiber diet: seeds, nuts, popcorn, bran, whole wheat, corn, quinoa, raw fruits and vegetables, berries and dried fruit, beans and lentils.    For additional details on low-fiber diet, please refer to the table on the last page.    2 days before:    Continue the low-fiber diet.     Drink at least 8 glasses of water throughout the day.     Stop eating solid foods at 11:45 pm.    1 day before:    In the morning: begin a clear liquid diet (liquids you can see through).     Examples of a clear liquid diet include: water, clear broth or bouillon, Gatorade, Pedialyte or Powerade, carbonated and non-carbonated soft drinks (Sprite , 7-Up , ginger ale), strained fruit juices without pulp (apple, white grape, white cranberry), Jell-O  and popsicles.     The following are not allowed on a clear liquid diet: red liquids, alcoholic beverages, dairy products (milk, creamer, and yogurt), protein shakes, creamy broths, juice with pulp and chewing tobacco.    At noon: take 2 (two) bisacodyl tablets     At 4 (and no later than 6pm): start drinking the  Miralax-Gatorade preparation (8.3 oz of Miralax mixed with 64 oz of Gatorade in a large pitcher). Drink 1(one) 8 oz glass every 15 minutes thereafter, until the mixture is gone.    COLON CLEANSING TIPS: drink adequate amounts of fluids before and after your colon cleansing to prevent dehydration. Stay near a toilet because you will have diarrhea. Even if you are sitting on the toilet, continue to drink the cleansing solution every 15 minutes. If you feel nauseous or vomit, rinse your mouth with water, take a 15 to 30-minute-break and then continue drinking the solution. You will be uncomfortable until the stool has flushed from your colon (in about 2 to 4 hours). You may feel chilled.    Day of your procedure  You may take all of your morning medications including blood pressure medications, blood thinners (if you have not been instructed to stop these by our office), methadone, anti-seizure medications with sips of water 3 hours prior to your procedure or earlier. Do not take insulin or vitamins prior to your procedure. Continue the clear liquid diet.       4 hours prior: drink 10 oz of magnesium citrate. It may be easier to drink it with a straw.    STOP consuming all liquids after that.     Do not take anything by mouth during this time.     Allow extra time to travel to your procedure as you may need to stop and use a restroom along the way.    You are ready for the procedure, if you followed all instructions and your stool is no longer formed, but clear or yellow liquid. If you are unsure whether your colon is clean, please call our office at [phone] before you leave for your appointment.    Bring the following to your procedure:  - Insurance Card/Photo ID.   - List of current medications including over-the-counter medications and supplements.   - Your rescue inhaler if you currently use one to control asthma.    Canceling or rescheduling your appointment:   If you must cancel or reschedule your  appointment, please call [phone] as soon as possible.      COLONOSCOPY PRE-PROCEDURE CHECKLIST    If you have diabetes, ask your regular doctor for diet and medication restrictions.  If you take an anticoagulant or anti-platelet medication (such as Coumadin , Lovenox , Pradaxa , Xarelto , Eliquis , etc.), please call your primary doctor for advice on holding this medication.  If you take aspirin you may continue to do so.  If you are or may be pregnant, please discuss the risks and benefits of this procedure with your doctor.        What happens during a colonoscopy?    Plan to spend up to two hours, starting at registration time, at the endoscopy center the day of your procedure. The colonoscopy takes an average of 15 to 30 minutes. Recovery time is about 30 minutes.      Before the exam:    You will change into a gown.    Your medical history and medication list will be reviewed with you, unless that has been done over the phone prior to the procedure.     A nurse will insert an intravenous (IV) line into your hand or arm.    The doctor will meet with you and will give you a consent form to sign.  During the exam:     Medicine will be given through the IV line to help you relax.     Your heart rate and oxygen levels will be monitored. If your blood pressure is low, you may be given fluids through the IV line.     The doctor will insert a flexible hollow tube, called a colonoscope, into your rectum. The scope will be advanced slowly through the large intestine (colon).    You may have a feeling of fullness or pressure.     If an abnormal tissue or a polyp is found, the doctor may remove it through the endoscope for closer examination, or biopsy. Tissue removal is painless    After the exam:           Any tissue samples removed during the exam will be sent to a lab for evaluation. It may take 5-7 working days for you to be notified of the results.     A nurse will provide you with complete discharge  instructions before you leave the endoscopy center. Be sure to ask the nurse for specific instructions if you take blood thinners such as Aspirin, Coumadin or Plavix.     The doctor will prepare a full report for you and for the physician who referred you for the procedure.     Your doctor will talk with you about the initial results of your exam.      Medication given during the exam will prohibit you from driving for the rest of the day.     Following the exam, you may resume your normal diet. Your first meal should be light, no greasy foods. Avoid alcohol until the next day.     You may resume your regular activities the day after the procedure.         LOW-FIBER DIET    Foods RECOMMENDED Foods to AVOID   Breads, Cereal, Rice and Pasta:   White bread, rolls, biscuits, croissant and buddy toast.   Waffles, Romanian toast and pancakes.   White rice, noodles, pasta, macaroni and peeled cooked potatoes.   Plain crackers and saltines.   Cooked cereals: farina, cream of rice.   Cold cereals: Puffed Rice , Rice Krispies , Corn Flakes  and Special K    Breads, Cereal, Rice and Pasta:   Breads or rolls with nuts, seeds or fruit.   Whole wheat, pumpernickel, rye breads and cornbread.   Potatoes with skin, brown or wild rice, and kasha (buckwheat).     Vegetables:   Tender cooked and canned vegetables without seeds: carrots, asparagus tips, green or wax beans, pumpkin, spinach, lima beans. Vegetables:   Raw or steamed vegetables.   Vegetables with seeds.   Sauerkraut.   Winter squash, peas, broccoli, Brussel sprouts, cabbage, onions, cauliflower, baked beans, peas and corn.   Fruits:   Strained fruit juice.   Canned fruit, except pineapple.   Ripe bananas and melon. Fruits:   Prunes and prune juice.   Raw fruits.   Dried fruits: figs, dates and raisins.   Milk/Dairy:   Milk: plain or flavored.   Yogurt, custard and ice cream.   Cheese and cottage cheese Milk/Dairy:     Meat and other proteins:   ground, well-cooked tender  beef, lamb, ham, veal, pork, fish, poultry and organ meats.   Eggs.   Peanut butter without nuts. Meat and other proteins:   Tough, fibrous meats with gristle.   Dry beans, peas and lentils.   Peanut butter with nuts.   Tofu.   Fats, Snack, Sweets, Condiments and Beverages:   Margarine, butter, oils, mayonnaise, sour cream and salad dressing, plain gravy.   Sugar, hard candy, clear jelly, honey and syrup.   Spices, cooked herbs, bouillon, broth and soups made with allowed vegetable, ketchup and mustard.   Coffee, tea and carbonated drinks.   Plain cakes, cookies and pretzels.   Gelatin, plain puddings, custard, ice cream, sherbet and popsicles. Fats, Snack, Sweets, Condiments and Beverages:   Nuts, seeds and coconut.   Jam, marmalade and preserves.   Pickles, olives, relish and horseradish.   All desserts containing nuts, seeds, dried fruit and coconut; or made from whole grains or bran.   Candy made with nuts or seeds.   Popcorn.         DIRECTIONS TO THE ENDOSCOPY DEPARTMENT    From the north ([facility])  Take [address] South, exit on [address] [address]. Get into the left hand hieu, turn left (east), go one-half mile to [address] and turn left. Go north to the second stoplight, take a right on [address] and follow it to the Main Hospital entrance.    From the south ([facility])  Take [address]N to the [address] split and exit on [address] [address]. On [address] [address], turn left (west) to [address]. Turn right (north) on [address]. Go north to the second stoplight, take a right on [address] and follow it to the Main Hospital entrance.    From the east via 35E ([facility])  Take [address] south to [address] [address] exit. Turn right on [address] [address]. Go west to [address]. Turn right (north) on [address]. Go to the second stoplight, take a right on [address] to the Main Hospital entrance.    From the east via [address] ([facility] [place])  Take [facility] [address]  West to [address]. Turn left (south) on [address] to [address], turn left (east) on [address] and follow it to the Main Hospital entrance.    From the west via [address] ([place])  Take [address] east to [address]. Turn right (south) on [address] to [address], turn left (east) on [address] and follow it to the Main Hospital entrance.

## 2021-09-22 NOTE — H&P
Pre-Endoscopy History and Physical     Ernesto Soliman MRN# [id number]   YOB: 1965 Age: 56 year old     Date of Procedure: 9/22/2021  Primary care provider: Andrews Whiteside  Type of Endoscopy: Colonoscopy with possible biopsy, possible polypectomy  Reason for Procedure: polyp  Type of Anesthesia Anticipated: Conscious Sedation    HPI:    Ernesto is a 56 year old male who will be undergoing the above procedure.      A history and physical has been performed. The patient's medications and allergies have been reviewed. The risks and benefits of the procedure and the sedation options and risks were discussed with the patient.  All questions were answered and informed consent was obtained.      He denies a personal or family history of anesthesia complications or bleeding disorders.     Patient Active Problem List   Diagnosis     ADD (attention deficit hyperactivity disorder, inattentive type)     Family history of coronary artery disease     Medication management     Colon polyp     Coronary artery disease involving native coronary artery     Hyperlipidemia LDL goal <70     Controlled substance agreement signed     Snoring     Class 1 obesity with serious comorbidity and body mass index (BMI) of 31.0 to 31.9 in adult, unspecified obesity type        Past Medical History:   Diagnosis Date     ADD (attention deficit hyperactivity disorder, inattentive type) 1997     CAD (coronary artery disease) 06/2015    REMBERTO x 2 = LAD  EF 35-40% - Dr Cheney     Colon polyp 04/2015    tubular adenoma - due 5 yrs     Controlled substance agreement signed 08/2016     Family history of coronary artery disease      Hyperlipidemia LDL goal <70      Medication management      Snoring     [facility] - [facility] Goltz        Past Surgical History:   Procedure Laterality Date     COLONOSCOPY  4/15    polyp x 1 6 mm - rectum - tubular adenoma - due 5 yrs     HEART CATH STENT COR W/WO PTCA  06/24/2015    REMBERTO x2 mid LAD, jailed diagonal  "    STRESS ECHO ([facility])  6/15    abnormal - followed by abnormal angiogram - LAD occlusion     VASECTOMY  11/11    dr perez       Social History     Tobacco Use     Smoking status: Never Smoker     Smokeless tobacco: Never Used   Substance Use Topics     Alcohol use: Yes     Alcohol/week: 0.0 - 1.0 standard drinks     Comment: casual       Family History   Problem Relation Age of Onset     Chronic Obstructive Pulmonary Disease Mother         smoker     ALS Father         smoker     Cystic Fibrosis Son      C.A.D. Maternal Uncle         stents x 3     C.A.D. Paternal Uncle         MI at 56     C.A.D. Maternal Grandfather         age 56     C.A.D. Paternal Grandfather         age 60     Colon Cancer No family hx of        Prior to Admission medications    Medication Sig Start Date End Date Taking? Authorizing Provider   aspirin (ASA) 81 MG tablet Take 1 tablet (81 mg) by mouth daily 10/15/19  Yes Andrews Whiteside MD   atorvastatin (LIPITOR) 40 MG tablet Take 1 tablet (40 mg) by mouth daily You need to make an appointment for refills. [phone] 6/8/21  Yes Andrews Whiteside MD   co-enzyme Q-10 100 MG CAPS capsule Take 200 mg by mouth daily   Yes Reported, Patient   zinc gluconate 50 MG tablet Take 50 mg by mouth daily   Yes Reported, Patient   sildenafil (REVATIO) 20 MG tablet Take 1 tablet (20 mg) by mouth daily Take 1-3 tabs 30-60 minutes before sexual activity. Do not use with nitroglycerin or alpha blockers. 8/12/21   Andrews Whiteside MD       No Known Allergies     REVIEW OF SYSTEMS:   5 point ROS negative except as noted above in HPI, including Gen., Resp., CV, GI &  system review.    PHYSICAL EXAM:   There were no vitals taken for this visit. Estimated body mass index is 32.93 kg/m  as calculated from the following:    Height as of 6/8/21: 1.753 m (5' 9\").    Weight as of 8/2/21: 101.2 kg (223 lb).   GENERAL APPEARANCE: alert, and oriented  MENTAL STATUS: alert  AIRWAY EXAM: Mallampatti Class I (visualization of the " soft palate, fauces, uvula, anterior and posterior pillars)  RESP: lungs clear to auscultation - no rales, rhonchi or wheezes  CV: regular rates and rhythm  DIAGNOSTICS:    Not indicated    IMPRESSION   ASA Class 1 - Healthy patient, no medical problems    PLAN:   Plan for Colonoscopy with possible biopsy, possible polypectomy. We discussed the risks, benefits and alternatives and the patient wished to proceed.    The above has been forwarded to the consulting provider.      Signed Electronically by: Andrea Horta MD  September 22, 2021

## 2021-09-23 LAB
PATH REPORT.COMMENTS IMP SPEC: NORMAL
PATH REPORT.COMMENTS IMP SPEC: NORMAL
PATH REPORT.FINAL DX SPEC: NORMAL
PATH REPORT.GROSS SPEC: NORMAL
PATH REPORT.MICROSCOPIC SPEC OTHER STN: NORMAL
PHOTO IMAGE: NORMAL

## 2021-09-23 PROCEDURE — 88305 TISSUE EXAM BY PATHOLOGIST: CPT | Mod: 26 | Performed by: PATHOLOGY

## 2021-09-25 ENCOUNTER — HEALTH MAINTENANCE LETTER (OUTPATIENT)
Age: 56
End: 2021-09-25

## 2021-12-08 DIAGNOSIS — I25.10 CORONARY ARTERY DISEASE INVOLVING NATIVE CORONARY ARTERY OF NATIVE HEART WITHOUT ANGINA PECTORIS: ICD-10-CM

## 2021-12-08 DIAGNOSIS — Z00.00 ROUTINE GENERAL MEDICAL EXAMINATION AT A HEALTH CARE FACILITY: ICD-10-CM

## 2021-12-08 DIAGNOSIS — E78.5 HYPERLIPIDEMIA LDL GOAL <70: ICD-10-CM

## 2021-12-08 DIAGNOSIS — Z82.49 FAMILY HISTORY OF CORONARY ARTERY DISEASE: ICD-10-CM

## 2021-12-08 RX ORDER — ATORVASTATIN CALCIUM 40 MG/1
40 TABLET, FILM COATED ORAL DAILY
Qty: 90 TABLET | Refills: 2 | Status: SHIPPED | OUTPATIENT
Start: 2021-12-08 | End: 2022-07-11

## 2022-04-04 ENCOUNTER — TRANSFERRED RECORDS (OUTPATIENT)
Dept: HEALTH INFORMATION MANAGEMENT | Facility: CLINIC | Age: 57
End: 2022-04-04

## 2022-07-05 DIAGNOSIS — E78.5 HYPERLIPIDEMIA LDL GOAL <70: ICD-10-CM

## 2022-07-05 DIAGNOSIS — Z00.00 ROUTINE GENERAL MEDICAL EXAMINATION AT A HEALTH CARE FACILITY: ICD-10-CM

## 2022-07-05 DIAGNOSIS — Z82.49 FAMILY HISTORY OF CORONARY ARTERY DISEASE: ICD-10-CM

## 2022-07-05 DIAGNOSIS — I25.10 CORONARY ARTERY DISEASE INVOLVING NATIVE CORONARY ARTERY OF NATIVE HEART WITHOUT ANGINA PECTORIS: ICD-10-CM

## 2022-07-06 NOTE — TELEPHONE ENCOUNTER
Routing refill request to provider for review/approval because:   Statins Protocol Failed 07/05/2022 05:25 PM   Protocol Details  LDL on file in past 12 months    Recent (12 mo) or future (30 days) visit within the authorizing provider's specialty        Please call pt to schedule an OV for fasting Px     Thank you     Laura Luna RN, BSN  [facility]

## 2022-07-11 RX ORDER — ATORVASTATIN CALCIUM 40 MG/1
TABLET, FILM COATED ORAL
Qty: 90 TABLET | Refills: 0 | Status: SHIPPED | OUTPATIENT
Start: 2022-07-11 | End: 2022-08-02

## 2022-07-11 NOTE — TELEPHONE ENCOUNTER
"Patient made appt for physical on 8/2 with Dr Whiteside-- totallty out of medication, no refills left as the one refill is \"sitting in [place]\".    Please send new RX to [facility] [place] on [address]yoti Sim       "

## 2022-07-11 NOTE — TELEPHONE ENCOUNTER
See refill request for Lipitor messages below.   Future Appointments 7/11/2022 - 1/7/2023      Date Visit Type Length Department Provider     8/2/2022  8:00 AM PREVENTATIVE ADULT            30 min [facility] Andrews Whiteside MD Monica R., RN   [facility] Triage

## 2022-07-31 ASSESSMENT — ENCOUNTER SYMPTOMS
FEVER: 0
HEMATOCHEZIA: 0
CHILLS: 0
PALPITATIONS: 0
COUGH: 0
CONSTIPATION: 0
HEADACHES: 0
DYSURIA: 0
PARESTHESIAS: 0
ABDOMINAL PAIN: 0
MYALGIAS: 0
DIARRHEA: 0
DIZZINESS: 0
NAUSEA: 0
FREQUENCY: 0
JOINT SWELLING: 0
SHORTNESS OF BREATH: 0
ARTHRALGIAS: 0
NERVOUS/ANXIOUS: 0
WEAKNESS: 0
SORE THROAT: 0
HEMATURIA: 0
EYE PAIN: 0

## 2022-08-02 ENCOUNTER — OFFICE VISIT (OUTPATIENT)
Dept: FAMILY MEDICINE | Facility: CLINIC | Age: 57
End: 2022-08-02
Payer: COMMERCIAL

## 2022-08-02 VITALS
RESPIRATION RATE: 20 BRPM | OXYGEN SATURATION: 97 % | SYSTOLIC BLOOD PRESSURE: 110 MMHG | HEART RATE: 63 BPM | HEIGHT: 69 IN | BODY MASS INDEX: 32.29 KG/M2 | TEMPERATURE: 95.6 F | WEIGHT: 218 LBS | DIASTOLIC BLOOD PRESSURE: 76 MMHG

## 2022-08-02 DIAGNOSIS — Z82.49 FAMILY HISTORY OF CORONARY ARTERY DISEASE: ICD-10-CM

## 2022-08-02 DIAGNOSIS — E78.5 HYPERLIPIDEMIA LDL GOAL <70: ICD-10-CM

## 2022-08-02 DIAGNOSIS — Z51.81 MEDICATION MONITORING ENCOUNTER: ICD-10-CM

## 2022-08-02 DIAGNOSIS — Z00.00 ROUTINE GENERAL MEDICAL EXAMINATION AT A HEALTH CARE FACILITY: Primary | ICD-10-CM

## 2022-08-02 DIAGNOSIS — E66.811 CLASS 1 OBESITY WITH SERIOUS COMORBIDITY AND BODY MASS INDEX (BMI) OF 32.0 TO 32.9 IN ADULT, UNSPECIFIED OBESITY TYPE: ICD-10-CM

## 2022-08-02 DIAGNOSIS — K63.5 POLYP OF COLON, UNSPECIFIED PART OF COLON, UNSPECIFIED TYPE: ICD-10-CM

## 2022-08-02 DIAGNOSIS — Z12.11 SCREEN FOR COLON CANCER: ICD-10-CM

## 2022-08-02 DIAGNOSIS — I25.10 CORONARY ARTERY DISEASE INVOLVING NATIVE CORONARY ARTERY OF NATIVE HEART WITHOUT ANGINA PECTORIS: ICD-10-CM

## 2022-08-02 DIAGNOSIS — Z12.5 SCREENING FOR PROSTATE CANCER: ICD-10-CM

## 2022-08-02 DIAGNOSIS — R06.83 SNORING: ICD-10-CM

## 2022-08-02 LAB
ALBUMIN SERPL-MCNC: 3.7 G/DL (ref 3.4–5)
ALBUMIN UR-MCNC: NEGATIVE MG/DL
ALP SERPL-CCNC: 61 U/L (ref 40–150)
ALT SERPL W P-5'-P-CCNC: 46 U/L (ref 0–70)
ANION GAP SERPL CALCULATED.3IONS-SCNC: 9 MMOL/L (ref 3–14)
APPEARANCE UR: CLEAR
AST SERPL W P-5'-P-CCNC: 21 U/L (ref 0–45)
BILIRUB SERPL-MCNC: 0.5 MG/DL (ref 0.2–1.3)
BILIRUB UR QL STRIP: NEGATIVE
BUN SERPL-MCNC: 17 MG/DL (ref 7–30)
CALCIUM SERPL-MCNC: 9 MG/DL (ref 8.5–10.1)
CHLORIDE BLD-SCNC: 106 MMOL/L (ref 94–109)
CHOLEST SERPL-MCNC: 125 MG/DL
CK SERPL-CCNC: 177 U/L (ref 30–300)
CO2 SERPL-SCNC: 23 MMOL/L (ref 20–32)
COLOR UR AUTO: YELLOW
CREAT SERPL-MCNC: 0.85 MG/DL (ref 0.66–1.25)
CREAT UR-MCNC: 81 MG/DL
ERYTHROCYTE [DISTWIDTH] IN BLOOD BY AUTOMATED COUNT: 12.7 % (ref 10–15)
FASTING STATUS PATIENT QL REPORTED: YES
GFR SERPL CREATININE-BSD FRML MDRD: >90 ML/MIN/1.73M2
GLUCOSE BLD-MCNC: 104 MG/DL (ref 70–99)
GLUCOSE UR STRIP-MCNC: NEGATIVE MG/DL
HCT VFR BLD AUTO: 43.2 % (ref 40–53)
HDLC SERPL-MCNC: 44 MG/DL
HGB BLD-MCNC: 15 G/DL (ref 13.3–17.7)
HGB UR QL STRIP: NEGATIVE
KETONES UR STRIP-MCNC: NEGATIVE MG/DL
LDLC SERPL CALC-MCNC: 58 MG/DL
LEUKOCYTE ESTERASE UR QL STRIP: NEGATIVE
MCH RBC QN AUTO: 29.7 PG (ref 26.5–33)
MCHC RBC AUTO-ENTMCNC: 34.7 G/DL (ref 31.5–36.5)
MCV RBC AUTO: 86 FL (ref 78–100)
MICROALBUMIN UR-MCNC: <5 MG/L
MICROALBUMIN/CREAT UR: NORMAL MG/G{CREAT}
NITRATE UR QL: NEGATIVE
NONHDLC SERPL-MCNC: 81 MG/DL
PH UR STRIP: 6 [PH] (ref 5–7)
PLATELET # BLD AUTO: 184 10E3/UL (ref 150–450)
POTASSIUM BLD-SCNC: 4.2 MMOL/L (ref 3.4–5.3)
PROT SERPL-MCNC: 6.7 G/DL (ref 6.8–8.8)
PSA SERPL-MCNC: 3.01 UG/L (ref 0–4)
RBC # BLD AUTO: 5.05 10E6/UL (ref 4.4–5.9)
SODIUM SERPL-SCNC: 138 MMOL/L (ref 133–144)
SP GR UR STRIP: 1.02 (ref 1–1.03)
TRIGL SERPL-MCNC: 114 MG/DL
TSH SERPL DL<=0.005 MIU/L-ACNC: 2.36 MU/L (ref 0.4–4)
UROBILINOGEN UR STRIP-ACNC: 0.2 E.U./DL
WBC # BLD AUTO: 5.7 10E3/UL (ref 4–11)

## 2022-08-02 PROCEDURE — 99396 PREV VISIT EST AGE 40-64: CPT | Performed by: FAMILY MEDICINE

## 2022-08-02 PROCEDURE — 81003 URINALYSIS AUTO W/O SCOPE: CPT | Performed by: FAMILY MEDICINE

## 2022-08-02 PROCEDURE — 82043 UR ALBUMIN QUANTITATIVE: CPT | Performed by: FAMILY MEDICINE

## 2022-08-02 PROCEDURE — 84443 ASSAY THYROID STIM HORMONE: CPT | Performed by: FAMILY MEDICINE

## 2022-08-02 PROCEDURE — G0103 PSA SCREENING: HCPCS | Performed by: FAMILY MEDICINE

## 2022-08-02 PROCEDURE — 85027 COMPLETE CBC AUTOMATED: CPT | Performed by: FAMILY MEDICINE

## 2022-08-02 PROCEDURE — 80053 COMPREHEN METABOLIC PANEL: CPT | Performed by: FAMILY MEDICINE

## 2022-08-02 PROCEDURE — 82550 ASSAY OF CK (CPK): CPT | Performed by: FAMILY MEDICINE

## 2022-08-02 PROCEDURE — 80061 LIPID PANEL: CPT | Performed by: FAMILY MEDICINE

## 2022-08-02 PROCEDURE — 36415 COLL VENOUS BLD VENIPUNCTURE: CPT | Performed by: FAMILY MEDICINE

## 2022-08-02 RX ORDER — ATORVASTATIN CALCIUM 40 MG/1
20 TABLET, FILM COATED ORAL DAILY
Qty: 45 TABLET | Refills: 3 | Status: SHIPPED | OUTPATIENT
Start: 2022-08-02 | End: 2024-04-12 | Stop reason: SINTOL

## 2022-08-02 ASSESSMENT — ENCOUNTER SYMPTOMS
WEAKNESS: 0
PALPITATIONS: 0
PARESTHESIAS: 0
ABDOMINAL PAIN: 0
NAUSEA: 0
MYALGIAS: 0
FEVER: 0
SORE THROAT: 0
DYSURIA: 0
HEADACHES: 0
COUGH: 0
SHORTNESS OF BREATH: 0
JOINT SWELLING: 0
HEMATOCHEZIA: 0
FREQUENCY: 0
CONSTIPATION: 0
NERVOUS/ANXIOUS: 0
DIARRHEA: 0
ARTHRALGIAS: 0
DIZZINESS: 0
EYE PAIN: 0
CHILLS: 0
HEMATURIA: 0

## 2022-08-02 NOTE — PROGRESS NOTES
[facility]  [place]    SUBJECTIVE    CC: Ernesto Soliman is an 56 year old male who presents for preventative health visit.     Patient has been advised of split billing requirements and indicates understanding: Yes     Healthy Habits:     Getting at least 3 servings of Calcium per day:  Yes    Bi-annual eye exam:  NO    Dental care twice a year:  Yes    Sleep apnea or symptoms of sleep apnea:  Excessive snoring    Diet:  Regular (no restrictions)    Frequency of exercise:  2-3 days/week    Duration of exercise:  Greater than 60 minutes    Taking medications regularly:  Yes    Medication side effects:  Muscle aches    PHQ-2 Total Score: 0    Additional concerns today:  No    CAD - no concerns - M Heart follow up soon - Dr Burnett - statin related myalgias with tennis    Hyperlipidemia Follow-Up      Are you regularly taking any medication or supplement to lower your cholesterol?   Yes- Atorvastatin    Are you having muscle aches or other side effects that you think could be caused by your cholesterol lowering medication?  No    Recent Labs   Lab Test 06/08/21  0827 10/15/19  0744 03/02/16  0853 07/22/15  0742 06/25/15  0305   CHOL 172 127   < > 160 242*   HDL 53 51   < > 44 54   LDL 77 54   < > 79 142*   TRIG 208* 112   < > 184* 232*   CHOLHDLRATIO  --   --   --  3.6 4.5    < > = values in this interval not displayed.     Snoring - wife notes increased snoring    Today's PHQ-2 Score:   PHQ-2 ( 1999 Pfizer) 7/31/2022   Q1: Little interest or pleasure in doing things 0   Q2: Feeling down, depressed or hopeless 0   PHQ-2 Score 0   PHQ-2 Total Score (12-17 Years)- Positive if 3 or more points; Administer PHQ-A if positive -   Q1: Little interest or pleasure in doing things Not at all   Q2: Feeling down, depressed or hopeless Not at all   PHQ-2 Score 0     Abuse: Current or Past(Physical, Sexual or Emotional)- No  Do you feel safe in your environment? Yes    Social History     Tobacco Use     Smoking status:  Never Smoker     Smokeless tobacco: Never Used   Substance Use Topics     Alcohol use: Yes     Alcohol/week: 0.0 - 1.0 standard drinks     Comment: casual     If you drink alcohol do you typically have >3 drinks per day or >7 drinks per week? No    Alcohol Use 8/2/2022   Prescreen: >3 drinks/day or >7 drinks/week? -   Prescreen: >3 drinks/day or >7 drinks/week? No       Last PSA:   PSA   Date Value Ref Range Status   06/08/2021 2.61 0 - 4 ug/L Final     Comment:     Assay Method:  Chemiluminescence using Siemens Vista analyzer     Reviewed orders with patient. Reviewed health maintenance and updated orders accordingly - Yes    Reviewed and updated as needed this visit by clinical staff   Tobacco  Allergies  Meds   Med Hx  Surg Hx  Fam Hx  Soc Hx        Reviewed and updated as needed this visit by Provider                   Review of Systems   Constitutional: Negative for chills and fever.   HENT: Negative for congestion, ear pain, hearing loss and sore throat.    Eyes: Negative for pain and visual disturbance.   Respiratory: Negative for cough and shortness of breath.    Cardiovascular: Negative for chest pain, palpitations and peripheral edema.   Gastrointestinal: Negative for abdominal pain, constipation, diarrhea, hematochezia and nausea.   Genitourinary: Negative for dysuria, frequency, genital sores, hematuria, impotence, penile discharge and urgency.   Musculoskeletal: Negative for arthralgias, joint swelling and myalgias.   Skin: Negative for rash.   Neurological: Negative for dizziness, weakness, headaches and paresthesias.   Psychiatric/Behavioral: Negative for mood changes. The patient is not nervous/anxious.      Health Maintenance     Colonoscopy:  Due 9/2026   FIT:  NA              PSA:  pending   DEXA:  NA    Health Maintenance Due   Topic Date Due     Pneumococcal Vaccine: Pediatrics (0 to 5 Years) and At-Risk Patients (6 to 64 Years) (1 - PCV) Never done     ZOSTER IMMUNIZATION (1 of 2) Never  done     COVID-19 Vaccine (3 - Booster for Pfizer series) 09/10/2021     LIPID  06/08/2022     PSA  06/08/2022       Current Problem List    Patient Active Problem List   Diagnosis     ADD (attention deficit hyperactivity disorder, inattentive type)     Family history of coronary artery disease     Medication management     Colon polyp     Coronary artery disease involving native coronary artery     Hyperlipidemia LDL goal <70     Controlled substance agreement signed     Snoring     Class 1 obesity with serious comorbidity and body mass index (BMI) of 32.0 to 32.9 in adult, unspecified obesity type       Past Medical History    Past Medical History:   Diagnosis Date     ADD (attention deficit hyperactivity disorder, inattentive type) 1997     CAD (coronary artery disease) 06/2015    REMBERTO x 2 = LAD  EF 35-40% - Dr Cheney     Colon polyp 04/2015    tubular adenoma - due 5 yrs     Controlled substance agreement signed 08/2016     Family history of coronary artery disease      Hyperlipidemia LDL goal <70      Medication management      Snoring     [facility] - Bennett Goltz       Past Surgical History    Past Surgical History:   Procedure Laterality Date     COLONOSCOPY  04/01/2015    polyp x 1 6 mm - rectum - tubular adenoma - due 5 yrs     COLONOSCOPY N/A 09/22/2021    polyp x 1 - inflammatory - due 5 yrs     HEART CATH STENT COR W/WO PTCA  06/24/2015    REMBERTO x2 mid LAD, jailed diagonal     STRESS ECHO (METRO)  06/01/2015    abnormal - followed by abnormal angiogram - LAD occlusion     VASECTOMY  11/01/2011    dr perez       Current Medications    Current Outpatient Medications   Medication Sig Dispense Refill     aspirin (ASA) 81 MG tablet Take 1 tablet (81 mg) by mouth daily       atorvastatin (LIPITOR) 40 MG tablet Take 0.5 tablets (20 mg) by mouth daily 45 tablet 3     co-enzyme Q-10 100 MG CAPS capsule Take 200 mg by mouth daily       zinc gluconate 50 MG tablet Take 50 mg by mouth daily          Allergies    No Known Allergies    Immunizations    Immunization History   Administered Date(s) Administered     COVID-19,PF,Pfizer (12+ Yrs) 03/20/2021, 04/10/2021     HepA-Adult 02/06/2008, 11/30/2012, 03/02/2016     HepB-Adult 02/06/2008, 11/30/2012, 03/02/2016     TD (ADULT, 7+) 01/01/2003, 01/01/2008     TDAP Vaccine (Boostrix) 11/30/2012     Twinrix A/B 02/06/2008, 11/30/2012, 03/02/2016       Family History    Family History   Problem Relation Age of Onset     Chronic Obstructive Pulmonary Disease Mother         smoker     ALS Father         smoker     Cystic Fibrosis Son      C.A.D. Maternal Uncle         stents x 3     C.A.D. Paternal Uncle         MI at 56     C.A.D. Maternal Grandfather         age 56     C.A.D. Paternal Grandfather         age 60     Coronary Artery Disease Maternal Grandmother      Colon Cancer No family hx of        Social History    Social History     Socioeconomic History     Marital status:      Spouse name: Nayely     Number of children: 3     Years of education: 16     Highest education level: Not on file   Occupational History     Employer: [employer]   Tobacco Use     Smoking status: Never Smoker     Smokeless tobacco: Never Used   Vaping Use     Vaping Use: Never used   Substance and Sexual Activity     Alcohol use: Yes     Alcohol/week: 0.0 - 1.0 standard drinks     Comment: casual     Drug use: No     Sexual activity: Yes     Partners: Female   Other Topics Concern      Service Not Asked     Blood Transfusions Not Asked     Caffeine Concern No     Comment: 2 cups daily, occas pop     Occupational Exposure Not Asked     Hobby Hazards Not Asked     Sleep Concern Not Asked     Stress Concern Not Asked     Weight Concern Not Asked     Special Diet Not Asked     Back Care Not Asked     Exercise Yes     Comment: 1-3/wk     Bike Helmet Not Asked     Seat Belt Yes     Self-Exams Not Asked     Parent/sibling w/ CABG, MI or angioplasty before 65F 55M? No   Social  "History Narrative     Not on file     Social Determinants of Health     Financial Resource Strain: Not on file   Food Insecurity: Not on file   Transportation Needs: Not on file   Physical Activity: Not on file   Stress: Not on file   Social Connections: Not on file   Intimate Partner Violence: Not on file   Housing Stability: Not on file       ROS    CONSTITUTIONAL: NEGATIVE for fever, chills, change in weight  INTEGUMENTARY/SKIN: NEGATIVE for worrisome rashes, moles or lesions  EYES: NEGATIVE for vision changes or irritation  ENT/MOUTH: NEGATIVE for ear, mouth and throat problems  RESP: NEGATIVE for significant cough or SOB  CV: NEGATIVE for chest pain, palpitations or peripheral edema  GI: NEGATIVE for nausea, abdominal pain, heartburn, or change in bowel habits  : NEGATIVE for frequency, dysuria, or hematuria  MUSCULOSKELETAL: NEGATIVE for significant arthralgias or myalgia  NEURO: NEGATIVE for weakness, dizziness or paresthesias  ENDOCRINE: NEGATIVE for temperature intolerance, skin/hair changes  HEME: NEGATIVE for bleeding problems  PSYCHIATRIC: NEGATIVE for changes in mood or affect    OBJECTIVE    /76   Pulse 63   Temp (!) 95.6  F (35.3  C) (Tympanic)   Resp 20   Ht 1.753 m (5' 9\")   Wt 98.9 kg (218 lb)   SpO2 97%   BMI 32.19 kg/m      EXAM:    GENERAL: healthy, alert and no distress  EYES: Eyes grossly normal to inspection, PERRL and conjunctivae and sclerae normal  HENT: ear canals and TM's normal, nose and mouth without ulcers or lesions  NECK: no adenopathy, no asymmetry, masses, or scars and thyroid normal to palpation  RESP: lungs clear to auscultation - no rales, rhonchi or wheezes  CV: regular rate and rhythm, normal S1 S2, no S3 or S4, no murmur, click or rub, no peripheral edema and peripheral pulses strong  ABDOMEN: soft, nontender, no hepatosplenomegaly, no masses and bowel sounds normal   (male): pt declines  RECTAL: pt declines  MS: no gross musculoskeletal defects noted, no " edema  SKIN: no suspicious lesions or rashes  NEURO: Normal strength and tone, mentation intact and speech normal  PSYCH: mentation appears normal, affect normal/bright  LYMPH: no cervical, supraclavicular, axillary, or inguinal adenopathy    DIAGNOSTICS/PROCEDURES    Pending    ASSESSMENT      ICD-10-CM    1. Routine general medical examination at a health care facility  Z00.00 Comprehensive metabolic panel     Lipid panel reflex to direct LDL Fasting     CBC with platelets     CK total     UA reflex to Microscopic and Culture     Albumin Random Urine Quantitative with Creat Ratio     TSH with free T4 reflex     Prostate Specific Antigen Screen     Fecal colorectal cancer screen FIT     REVIEW OF HEALTH MAINTENANCE PROTOCOL ORDERS     Comprehensive metabolic panel     Lipid panel reflex to direct LDL Fasting     CBC with platelets     CK total     UA reflex to Microscopic and Culture     Albumin Random Urine Quantitative with Creat Ratio     TSH with free T4 reflex     Prostate Specific Antigen Screen     Fecal colorectal cancer screen FIT     atorvastatin (LIPITOR) 40 MG tablet   2. Coronary artery disease involving native coronary artery of native heart without angina pectoris  I25.10 Comprehensive metabolic panel     Lipid panel reflex to direct LDL Fasting     UA reflex to Microscopic and Culture     Albumin Random Urine Quantitative with Creat Ratio     REVIEW OF HEALTH MAINTENANCE PROTOCOL ORDERS     Comprehensive metabolic panel     Lipid panel reflex to direct LDL Fasting     UA reflex to Microscopic and Culture     Albumin Random Urine Quantitative with Creat Ratio     atorvastatin (LIPITOR) 40 MG tablet   3. Hyperlipidemia LDL goal <70  E78.5 Comprehensive metabolic panel     Lipid panel reflex to direct LDL Fasting     CK total     REVIEW OF HEALTH MAINTENANCE PROTOCOL ORDERS     Comprehensive metabolic panel     Lipid panel reflex to direct LDL Fasting     CK total     atorvastatin (LIPITOR) 40 MG  tablet   4. Snoring  R06.83 REVIEW OF HEALTH MAINTENANCE PROTOCOL ORDERS     Adult Sleep Eval & Management  Referral   5. Family history of coronary artery disease  Z82.49 Lipid panel reflex to direct LDL Fasting     REVIEW OF HEALTH MAINTENANCE PROTOCOL ORDERS     Lipid panel reflex to direct LDL Fasting     atorvastatin (LIPITOR) 40 MG tablet   6. Class 1 obesity with serious comorbidity and body mass index (BMI) of 32.0 to 32.9 in adult, unspecified obesity type  E66.9     Z68.32    7. Polyp of colon, unspecified part of colon, unspecified type  K63.5 Fecal colorectal cancer screen FIT     REVIEW OF HEALTH MAINTENANCE PROTOCOL ORDERS     Fecal colorectal cancer screen FIT   8. Screen for colon cancer  Z12.11 Fecal colorectal cancer screen FIT     REVIEW OF HEALTH MAINTENANCE PROTOCOL ORDERS     Fecal colorectal cancer screen FIT   9. Screening for prostate cancer  Z12.5 Prostate Specific Antigen Screen     REVIEW OF HEALTH MAINTENANCE PROTOCOL ORDERS     Prostate Specific Antigen Screen   10. Medication monitoring encounter  Z51.81 Comprehensive metabolic panel     Lipid panel reflex to direct LDL Fasting     CBC with platelets     CK total     UA reflex to Microscopic and Culture     Albumin Random Urine Quantitative with Creat Ratio     TSH with free T4 reflex     REVIEW OF HEALTH MAINTENANCE PROTOCOL ORDERS     Comprehensive metabolic panel     Lipid panel reflex to direct LDL Fasting     CBC with platelets     CK total     UA reflex to Microscopic and Culture     Albumin Random Urine Quantitative with Creat Ratio     TSH with free T4 reflex       PLAN    Discussed treatment/modality options, including risk and benefits, he desires:    advised alcohol consumption 1oz per day or less, advised aspirin 81 mg po daily, advised 1 multivitamin per day, advised calcium 7458-9391 mg/d and Vitamin D 800-1200 IU/d, advised dentist every 6 months, advised diet, exercise, and weight loss, advised opthalmologist  "every 1-2 years, advised self testicular exam q month, further health care maintenance, further lab(s), immunization(s), medication change(s), medication refill(s) and observation    Discussed controversies surrounding PSA. Specifically reviewed 2017 USPSTF findings recommending discussion of PSA testing for men ages 55-69.  Reviewed findings of the  Randomized Study of Screening for Prostate Cancer which showed a 30% reduction in advanced stage prostate cancer and a 20% reduction in death rate from prostate cancer in this age group. PSA-based screening may prevent up to 2 deaths and up to 3 cases of metastatic disease per 1,000 men screened over 13 years.    We've elected to do PSA this year after discussing these controversies.    All diagnosis above reviewed and noted above, otherwise stable.      See Access Information Management orders for further details.      1) meds refilled, trial of decreased atorvastatin to 20 mg    2) labs pending    3) immunizations reviewed - declines COVID, Flu, Tdap, Shingrix    4) Cardiology    5) [facility]/    No follow-ups on file.    Health Maintenance Due   Topic Date Due     Pneumococcal Vaccine: Pediatrics (0 to 5 Years) and At-Risk Patients (6 to 64 Years) (1 - PCV) Never done     ZOSTER IMMUNIZATION (1 of 2) Never done     COVID-19 Vaccine (3 - Booster for Pfizer series) 09/10/2021     LIPID  06/08/2022     PSA  06/08/2022       COUNSELING    Reviewed preventive health counseling, as reflected in patient instructions    BP Readings from Last 1 Encounters:   08/02/22 110/76     Estimated body mass index is 32.19 kg/m  as calculated from the following:    Height as of this encounter: 1.753 m (5' 9\").    Weight as of this encounter: 98.9 kg (218 lb).      Weight management plan: diet and exercise     reports that he has never smoked. He has never used smokeless tobacco.      Counseling Resources:    ATP IV Guidelines  Pooled Cohorts Equation Calculator  FRAX Risk " Assessment  ICSI Preventive Guidelines  Dietary Guidelines for Americans, 2010  USDA's MyPlate  ASA Prophylaxis  Lung CA Screening         Andrews Whiteside MD, FAAFP     [facility]  [address] [place] [id number]  [email or web address]   Office: [phone]  Fax: [phone]  Pager: [phone]

## 2022-08-02 NOTE — LETTER
[facility]  [address] [place] [address]  [phone]                    August 4, 2022    Ernesto Soliman  [address] [facility] [id number]      Dear Ernesto,    Here is a summary of your recent test results:  Labs are overall quite good, except     Glucose is slightly increased     We advise:     Continue current cares.   Balanced low cholesterol diet.   Regular exercise.   Weight loss.     Recheck fasting glucose in 3 months (glucose, a1c)     Your test results are enclosed.      Please contact me if you have any questions.    Thank you very much for trusting [facility].     Healthy regards,          Andrews Whiteside M.D.        Results for orders placed or performed in visit on 08/02/22   Comprehensive metabolic panel     Status: Abnormal   Result Value Ref Range    Sodium 138 133 - 144 mmol/L    Potassium 4.2 3.4 - 5.3 mmol/L    Chloride 106 94 - 109 mmol/L    Carbon Dioxide (CO2) 23 20 - 32 mmol/L    Anion Gap 9 3 - 14 mmol/L    Urea Nitrogen 17 7 - 30 mg/dL    Creatinine 0.85 0.66 - 1.25 mg/dL    Calcium 9.0 8.5 - 10.1 mg/dL    Glucose 104 (H) 70 - 99 mg/dL    Alkaline Phosphatase 61 40 - 150 U/L    AST 21 0 - 45 U/L    ALT 46 0 - 70 U/L    Protein Total 6.7 (L) 6.8 - 8.8 g/dL    Albumin 3.7 3.4 - 5.0 g/dL    Bilirubin Total 0.5 0.2 - 1.3 mg/dL    GFR Estimate >90 >60 mL/min/1.73m2   Lipid panel reflex to direct LDL Fasting     Status: None   Result Value Ref Range    Cholesterol 125 <200 mg/dL    Triglycerides 114 <150 mg/dL    Direct Measure HDL 44 >=40 mg/dL    LDL Cholesterol Calculated 58 <=100 mg/dL    Non HDL Cholesterol 81 <130 mg/dL    Patient Fasting > 8hrs? Yes     Narrative    Cholesterol  Desirable:  <200 mg/dL    Triglycerides  Normal:  Less than 150 mg/dL  Borderline High:  150-199 mg/dL  High:  200-499 mg/dL  Very High:  Greater than or equal to 500 mg/dL    Direct Measure HDL  Female:  Greater than or equal to 50  mg/dL   Male:  Greater than or equal to 40 mg/dL    LDL Cholesterol  Desirable:  <100mg/dL  Above Desirable:  100-129 mg/dL   Borderline High:  130-159 mg/dL   High:  160-189 mg/dL   Very High:  >= 190 mg/dL    Non HDL Cholesterol  Desirable:  130 mg/dL  Above Desirable:  130-159 mg/dL  Borderline High:  160-189 mg/dL  High:  190-219 mg/dL  Very High:  Greater than or equal to 220 mg/dL   CBC with platelets     Status: Normal   Result Value Ref Range    WBC Count 5.7 4.0 - 11.0 10e3/uL    RBC Count 5.05 4.40 - 5.90 10e6/uL    Hemoglobin 15.0 13.3 - 17.7 g/dL    Hematocrit 43.2 40.0 - 53.0 %    MCV 86 78 - 100 fL    MCH 29.7 26.5 - 33.0 pg    MCHC 34.7 31.5 - 36.5 g/dL    RDW 12.7 10.0 - 15.0 %    Platelet Count 184 150 - 450 10e3/uL   CK total     Status: Normal   Result Value Ref Range     30 - 300 U/L   UA reflex to Microscopic and Culture     Status: Normal    Specimen: Urine, NOS   Result Value Ref Range    Color Urine Yellow Colorless, Straw, Light Yellow, Yellow    Appearance Urine Clear Clear    Glucose Urine Negative Negative mg/dL    Bilirubin Urine Negative Negative    Ketones Urine Negative Negative mg/dL    Specific Gravity Urine 1.020 1.003 - 1.035    Blood Urine Negative Negative    pH Urine 6.0 5.0 - 7.0    Protein Albumin Urine Negative Negative mg/dL    Urobilinogen Urine 0.2 0.2, 1.0 E.U./dL    Nitrite Urine Negative Negative    Leukocyte Esterase Urine Negative Negative    Narrative    Microscopic not indicated   Albumin Random Urine Quantitative with Creat Ratio     Status: None   Result Value Ref Range    Creatinine Urine mg/dL 81 mg/dL    Albumin Urine mg/L <5 mg/L    Albumin Urine mg/g Cr     TSH with free T4 reflex     Status: Normal   Result Value Ref Range    TSH 2.36 0.40 - 4.00 mU/L   Prostate Specific Antigen Screen     Status: Normal   Result Value Ref Range    Prostate Specific Antigen Screen 3.01 0.00 - 4.00 ug/L

## 2022-09-21 ENCOUNTER — TELEPHONE (OUTPATIENT)
Dept: FAMILY MEDICINE | Facility: CLINIC | Age: 57
End: 2022-09-21

## 2022-09-21 DIAGNOSIS — T75.3XXA SEA SICKNESS, INITIAL ENCOUNTER: Primary | ICD-10-CM

## 2022-09-21 NOTE — TELEPHONE ENCOUNTER
Routing to pcp, please review request for medication below.     Leonela LANDERS RN   [facility] Triage

## 2022-09-21 NOTE — TELEPHONE ENCOUNTER
Patient is calling to ask for a rx for sea sickness. He is going to [place] and will be on a smaller boat. He says he thinks the rx will be better/stronger than the OTC.

## 2022-09-22 RX ORDER — SCOLOPAMINE TRANSDERMAL SYSTEM 1 MG/1
1 PATCH, EXTENDED RELEASE TRANSDERMAL
Qty: 4 PATCH | Refills: 0 | Status: SHIPPED | OUTPATIENT
Start: 2022-09-22 | End: 2024-04-12

## 2022-09-22 RX ORDER — SCOLOPAMINE TRANSDERMAL SYSTEM 1 MG/1
1 PATCH, EXTENDED RELEASE TRANSDERMAL
Qty: 2 PATCH | Refills: 0 | Status: SHIPPED | OUTPATIENT
Start: 2022-09-22 | End: 2022-09-22

## 2022-09-22 NOTE — TELEPHONE ENCOUNTER
Rx pended for 4.     Routing to provider to review and advise.     Germaine Small RN  [place] Triage

## 2022-09-22 NOTE — TELEPHONE ENCOUNTER
Called patient and explained Dr. Whiteside's note.     Advised don't touch eye and explained discontinue of his pupil dilates    Change site every 3 days   Advised to put patch on at least a couple hours before going on the boat   he needs 2 patches/he will be on the boat for 6 days     Patient denied ?'s and agreed to plan    Leonela LANDERS RN   [facility] Triage

## 2022-09-22 NOTE — TELEPHONE ENCOUNTER
Ok for scopolamine, 1 patch every 3 days, how long is he going    Do not touch eyes after applying patch, wash hands thoroughly

## 2022-09-22 NOTE — TELEPHONE ENCOUNTER
Pt called back and was confused as to how many days on the water, it will be ten in total.  So can we change the quantity to 4 please.  He leaves this Sunday.  Alyssa in [place] please.

## 2022-11-01 ENCOUNTER — APPOINTMENT (OUTPATIENT)
Dept: URBAN - METROPOLITAN AREA CLINIC 253 | Age: 57
Setting detail: DERMATOLOGY
End: 2022-11-02

## 2022-11-01 VITALS — RESPIRATION RATE: 14 BRPM | HEIGHT: 69 IN | WEIGHT: 200 LBS

## 2022-11-01 DIAGNOSIS — L82.1 OTHER SEBORRHEIC KERATOSIS: ICD-10-CM

## 2022-11-01 DIAGNOSIS — D485 NEOPLASM OF UNCERTAIN BEHAVIOR OF SKIN: ICD-10-CM

## 2022-11-01 PROBLEM — D48.5 NEOPLASM OF UNCERTAIN BEHAVIOR OF SKIN: Status: ACTIVE | Noted: 2022-11-01

## 2022-11-01 PROCEDURE — OTHER MIPS QUALITY: OTHER

## 2022-11-01 PROCEDURE — 11102 TANGNTL BX SKIN SINGLE LES: CPT

## 2022-11-01 PROCEDURE — OTHER PHOTO-DOCUMENTATION: OTHER

## 2022-11-01 PROCEDURE — OTHER BIOPSY BY SHAVE METHOD: OTHER

## 2022-11-01 PROCEDURE — 99202 OFFICE O/P NEW SF 15 MIN: CPT | Mod: 25

## 2022-11-01 PROCEDURE — OTHER COUNSELING: OTHER

## 2022-11-01 ASSESSMENT — LOCATION DETAILED DESCRIPTION DERM
LOCATION DETAILED: LEFT INFERIOR CENTRAL MALAR CHEEK
LOCATION DETAILED: LEFT INFERIOR LATERAL FOREHEAD

## 2022-11-01 ASSESSMENT — LOCATION SIMPLE DESCRIPTION DERM
LOCATION SIMPLE: LEFT FOREHEAD
LOCATION SIMPLE: LEFT CHEEK

## 2022-11-01 ASSESSMENT — LOCATION ZONE DERM: LOCATION ZONE: FACE

## 2022-11-01 NOTE — PROCEDURE: BIOPSY BY SHAVE METHOD
Render Path Notes In Note?: No
Size Of Lesion In Cm: 0
Post-Care Instructions: I reviewed with the patient in detail post-care instructions. Patient is to keep the biopsy site dry overnight, and then apply bacitracin twice daily until healed. Patient may apply hydrogen peroxide soaks to remove any crusting.
Electrodesiccation Text: The wound bed was treated with electrodesiccation after the biopsy was performed.
Silver Nitrate Text: The wound bed was treated with silver nitrate after the biopsy was performed.
Dressing: bandage
Hemostasis: Drysol
Curettage Text: The wound bed was treated with curettage after the biopsy was performed.
Biopsy Method: Dermablade
Consent: Written consent was obtained and risks were reviewed including but not limited to scarring, infection, bleeding, scabbing, incomplete removal, nerve damage and allergy to anesthesia.
Type Of Destruction Used: Curettage
Was A Bandage Applied: Yes
Cryotherapy Text: The wound bed was treated with cryotherapy after the biopsy was performed.
Billing Type: Third-Party Bill
Information: Selecting Yes will display possible errors in your note based on the variables you have selected. This validation is only offered as a suggestion for you. PLEASE NOTE THAT THE VALIDATION TEXT WILL BE REMOVED WHEN YOU FINALIZE YOUR NOTE. IF YOU WANT TO FAX A PRELIMINARY NOTE YOU WILL NEED TO TOGGLE THIS TO 'NO' IF YOU DO NOT WANT IT IN YOUR FAXED NOTE.
Anesthesia Volume In Cc (Will Not Render If 0): 0.1
Depth Of Biopsy: dermis
Biopsy Type: H and E
Anesthesia Type: 2% lidocaine with epinephrine and a 1:10 solution of 8.4% sodium bicarbonate
Notification Instructions: Patient will be notified of biopsy results. However, patient instructed to call the office if not contacted within 2 weeks.
Detail Level: Detailed
Electrodesiccation And Curettage Text: The wound bed was treated with electrodesiccation and curettage after the biopsy was performed.
Wound Care: Petrolatum

## 2022-12-20 ENCOUNTER — IMMUNIZATION (OUTPATIENT)
Dept: NURSING | Facility: CLINIC | Age: 57
End: 2022-12-20
Payer: COMMERCIAL

## 2022-12-20 PROCEDURE — 91312 COVID-19 VACCINE BIVALENT BOOSTER 12+ (PFIZER): CPT

## 2022-12-20 PROCEDURE — 0124A COVID-19 VACCINE BIVALENT BOOSTER 12+ (PFIZER): CPT

## 2023-03-21 ASSESSMENT — SLEEP AND FATIGUE QUESTIONNAIRES
HOW LIKELY ARE YOU TO NOD OFF OR FALL ASLEEP WHILE WATCHING TV: WOULD NEVER DOZE
HOW LIKELY ARE YOU TO NOD OFF OR FALL ASLEEP WHILE SITTING QUIETLY AFTER LUNCH WITHOUT ALCOHOL: WOULD NEVER DOZE
HOW LIKELY ARE YOU TO NOD OFF OR FALL ASLEEP WHEN YOU ARE A PASSENGER IN A CAR FOR AN HOUR WITHOUT A BREAK: WOULD NEVER DOZE
HOW LIKELY ARE YOU TO NOD OFF OR FALL ASLEEP IN A CAR, WHILE STOPPED FOR A FEW MINUTES IN TRAFFIC: WOULD NEVER DOZE
HOW LIKELY ARE YOU TO NOD OFF OR FALL ASLEEP WHILE LYING DOWN TO REST IN THE AFTERNOON WHEN CIRCUMSTANCES PERMIT: WOULD NEVER DOZE
HOW LIKELY ARE YOU TO NOD OFF OR FALL ASLEEP WHILE SITTING AND TALKING TO SOMEONE: WOULD NEVER DOZE
HOW LIKELY ARE YOU TO NOD OFF OR FALL ASLEEP WHILE SITTING INACTIVE IN A PUBLIC PLACE: WOULD NEVER DOZE
HOW LIKELY ARE YOU TO NOD OFF OR FALL ASLEEP WHILE SITTING AND READING: SLIGHT CHANCE OF DOZING

## 2023-04-22 ENCOUNTER — HEALTH MAINTENANCE LETTER (OUTPATIENT)
Age: 58
End: 2023-04-22

## 2023-06-21 ENCOUNTER — OFFICE VISIT (OUTPATIENT)
Dept: SLEEP MEDICINE | Facility: CLINIC | Age: 58
End: 2023-06-21
Attending: FAMILY MEDICINE
Payer: COMMERCIAL

## 2023-06-21 VITALS
HEIGHT: 69 IN | HEART RATE: 63 BPM | DIASTOLIC BLOOD PRESSURE: 64 MMHG | BODY MASS INDEX: 31.83 KG/M2 | WEIGHT: 214.9 LBS | SYSTOLIC BLOOD PRESSURE: 101 MMHG

## 2023-06-21 DIAGNOSIS — G47.33 OBSTRUCTIVE SLEEP APNEA: Primary | ICD-10-CM

## 2023-06-21 DIAGNOSIS — R06.83 SNORING: ICD-10-CM

## 2023-06-21 PROCEDURE — 99203 OFFICE O/P NEW LOW 30 MIN: CPT | Performed by: STUDENT IN AN ORGANIZED HEALTH CARE EDUCATION/TRAINING PROGRAM

## 2023-06-21 RX ORDER — ZOLPIDEM TARTRATE 10 MG/1
10 TABLET ORAL
Qty: 1 TABLET | Refills: 0 | Status: SHIPPED | OUTPATIENT
Start: 2023-06-21 | End: 2023-08-10

## 2023-06-21 ASSESSMENT — SLEEP AND FATIGUE QUESTIONNAIRES
HOW LIKELY ARE YOU TO NOD OFF OR FALL ASLEEP WHILE LYING DOWN TO REST IN THE AFTERNOON WHEN CIRCUMSTANCES PERMIT: WOULD NEVER DOZE
HOW LIKELY ARE YOU TO NOD OFF OR FALL ASLEEP WHILE SITTING AND READING: SLIGHT CHANCE OF DOZING
HOW LIKELY ARE YOU TO NOD OFF OR FALL ASLEEP WHILE SITTING INACTIVE IN A PUBLIC PLACE: WOULD NEVER DOZE
HOW LIKELY ARE YOU TO NOD OFF OR FALL ASLEEP WHILE SITTING QUIETLY AFTER LUNCH WITHOUT ALCOHOL: WOULD NEVER DOZE
HOW LIKELY ARE YOU TO NOD OFF OR FALL ASLEEP WHEN YOU ARE A PASSENGER IN A CAR FOR AN HOUR WITHOUT A BREAK: WOULD NEVER DOZE
HOW LIKELY ARE YOU TO NOD OFF OR FALL ASLEEP WHILE WATCHING TV: WOULD NEVER DOZE
HOW LIKELY ARE YOU TO NOD OFF OR FALL ASLEEP WHILE SITTING AND TALKING TO SOMEONE: WOULD NEVER DOZE
HOW LIKELY ARE YOU TO NOD OFF OR FALL ASLEEP IN A CAR, WHILE STOPPED FOR A FEW MINUTES IN TRAFFIC: WOULD NEVER DOZE

## 2023-06-21 NOTE — PROGRESS NOTES
Home Sleep Study Date: 1/6/2020  Sleep Associated Hypoxemia: sustained hypoxemia was not present. Baseline oxygen saturation was 91.8%.  Time with saturation less than or equal to 88% was 2.5 minutes. The lowest oxygen saturation was 85%.   Respiratory events: The home study revealed a apnea/hypopnea index [AHI] of 2.8 events per hour.  AHI was 3.5 per hour supine.    [facility]  Consultation Note    Name: Ernetso Soliman MRN#: [id number]   Age: 57 year old YOB: 1965     Date of Consultation: 06/21/23  Consultation is requested by: Andrews Whiteside  Primary care provider: Andrews Whiteside MD    History of Present Illness:   Ernesto Soliman is a 57 year old male patient with CAD s/p PCI, HLD   He is sent by Andrews Whiteside for a sleep consultation regarding Snoring  .    Ernesto Soliman main reason for visit: Snoring  Patient states problem(s) started: 1 year  Ernesto Soliman's goals for this visit: Understand/Goal to resolve    He has had a previous sleep study about 3 years ago. Important findings: AHI 2.8, supine AHI 3.5 .    CPAP: No    SLEEP-WAKE SCHEDULE:   Work/School Days: Patient goes to school/work: No   Usually gets into bed at 9:30pm  Takes patient about 10min to fall asleep  Has trouble falling asleep 1 nights per week  Wakes up in the middle of the night 1 times.  Wakes up due to Snorting self awake;Use the bathroom  He has trouble falling back asleep none times a week.   It usually takes 2min to get back to sleep  Patient is usually up at 7am  Uses alarm: No  Sleep Inertia: Yes    Weekends/Non-work Days/All Other Days:  Usually gets into bed at 9:30   Takes patient about 10min to fall asleep  Patient is usually up at 7am  Uses alarm: No    Sleep Need  Patient gets  6 sleep on average   Patient thinks He needs about 8 sleep    Ernesto Soliman prefers to sleep in this position(s): Back;Side   Patient states they do the following activities in bed: Read;Use phone, computer, or  tablet    Naps  Patient takes a purposeful nap none times a week and naps are usually none in duration  He feels better after a nap:    He dozes off unintentionally dont days per week  Patient has had a driving accident or near-miss due to sleepiness/drowsiness: No      SLEEP DISRUPTIONS:  Breathing/Snoring  Patient snores:Yes  Other people complain about His snoring: Yes  Patient has been told He stops breathing in His sleep:No  He has issues with the following: Morning mouth dryness    Movement:  Patient gets pain, discomfort, with an urge to move:  No  It happens when He is resting:  No  It happens more at night:  No  Patient has been told Ernesto Soliman kicks His legs at night:  No     Behaviors in Sleep:  Ernesto Soliman has experienced the following behaviors while sleeping:    He has experienced sudden muscle weakness during the day: No    Is there anything else you would like your sleep provider to know:        CAFFEINE AND OTHER SUBSTANCES:  Patient consumes caffeinated beverages per day:  2 - 3 cups coffee  Last caffeine use is usually: 5pm  List of any prescribed or over the counter stimulants that patient takes:    List of any prescribed or over the counter sleep medication patient takes: none  List of previous sleep medications that patient has tried:    Patient drinks alcohol to help them sleep: No  Patient drinks alcohol near bedtime: No    Family History:  Patient has a family member been diagnosed with a sleep disorder: Yes  2         SCALES:  EPWORTH SLEEPINESS SCALE       6/21/2023     7:50 AM    Coulter Sleepiness Scale ( MINAL Swenson  [id number]<br>ESS - [place]/English - Final version - 21 Nov 07 - [facility] [place].)   Sitting and reading Slight chance of dozing   Watching TV Would never doze   Sitting, inactive in a public place (e.g. a theatre or a meeting) Would never doze   As a passenger in a car for an hour without a break Would never doze   Lying down to rest in the afternoon when  "circumstances permit Would never doze   Sitting and talking to someone Would never doze   Sitting quietly after a lunch without alcohol Would never doze   In a car, while stopped for a few minutes in traffic Would never doze   Kouts Score (MC) 1   Kouts Score (Sleep) 1       INSOMNIA SEVERITY INDEX (NAS)        6/21/2023     7:41 AM   Insomnia Severity Index (NAS)   Difficulty falling asleep 1   Difficulty staying asleep 0   Problems waking up too early 1   How SATISFIED/DISSATISFIED are you with your CURRENT sleep pattern? 2   How NOTICEABLE to others do you think your sleep problem is in terms of impairing the quality of your life? 3   How WORRIED/DISTRESSED are you about your current sleep problem? 3   To what extent do you consider your sleep problem to INTERFERE with your daily functioning (e.g. daytime fatigue, mood, ability to function at work/daily chores, concentration, memory, mood, etc.) CURRENTLY? 3   NAS Total Score 13    Guidelines for Scoring/Interpretation:  Total score categories:  0-7 = No clinically significant insomnia   8-14 = Subthreshold insomnia   15-21 = Clinical insomnia (moderate severity)  22-28 = Clinical insomnia (severe)  Used via courtesy of www.[place].gov with permission from Edwardo Elizabeth PhD., [facility]      STOP BANG   ARJUN High Risk            Total Score: 5    ARJUN: Snores loudly    ARJUN: Often tired    ARJUN: Over 50 ys old    ARJUN: Neck Circum >16 in    ARJUN: Male          GAD7       No data to display                  CAGE-AID       No data to display              CAGE-AID reprinted with permission from the [place] Medical Journal, TIM Guzman. and CATA Verduzco, \"Conjoint screening questionnaires for alcohol and drug abuse\" [place] Medical Journal 94: 135-140, 1995.      PATIENT HEALTH QUESTIONNAIRE-9 (PHQ - 9)      6/8/2021     7:35 AM   PHQ-9 ([employer])   1.  Little interest or pleasure in doing things 0   2.  Feeling down, depressed, or hopeless 0   3.  Trouble " falling or staying asleep, or sleeping too much 0   4.  Feeling tired or having little energy 0   5.  Poor appetite or overeating 0   6.  Feeling bad about yourself - or that you are a failure or have let yourself or your family down 0   7.  Trouble concentrating on things, such as reading the newspaper or watching television 0   8.  Moving or speaking so slowly that other people could have noticed. Or the opposite - being so fidgety or restless that you have been moving around a lot more than usual 0   9.  Thoughts that you would be better off dead, or of hurting yourself in some way 0   PHQ-9 Total Score 0   If you checked off any problems, how difficult have these problems made it for you to do your work, take care of things at home, or get along with other people? Not difficult at all   6.  Feeling bad about yourself 0   7.  Trouble concentrating 0   8.  Moving slowly or restless 0   9.  Suicidal or self-harm thoughts 0   Difficulty at work, home, or with people Not difficult at all     Developed by Caroline Marion, Tuyet Narvaez, Chico Olson and colleagues, with an educational ely from [employer] No permission required to reproduce, translate, display or distribute.      Allergies:    No Known Allergies    Medications:    Current Outpatient Medications   Medication Sig Dispense Refill     atorvastatin (LIPITOR) 40 MG tablet Take 0.5 tablets (20 mg) by mouth daily 45 tablet 3     co-enzyme Q-10 100 MG CAPS capsule Take 200 mg by mouth daily       zinc gluconate 50 MG tablet Take 50 mg by mouth daily       zolpidem (AMBIEN) 10 MG tablet Take 1 tablet (10 mg) by mouth nightly as needed for sleep (take night of sleep) 1 tablet 0     aspirin (ASA) 81 MG tablet Take 1 tablet (81 mg) by mouth daily       scopolamine (TRANSDERM) 1 MG/3DAYS 72 hr patch Place 1 patch onto the skin every 72 hours 4 patch 0       Problem List:  Patient Active Problem List    Diagnosis Date Noted     Medication management       Priority: High     ADD (attention deficit hyperactivity disorder, inattentive type)      Priority: High     Obstructive sleep apnea 06/21/2023     Priority: Medium     Class 1 obesity with serious comorbidity and body mass index (BMI) of 32.0 to 32.9 in adult, unspecified obesity type 06/08/2021     Priority: Medium     Snoring      Priority: Medium     Controlled substance agreement signed      Priority: Medium     Hyperlipidemia LDL goal <70 07/17/2015     Priority: Medium     Coronary artery disease involving native coronary artery      Priority: Medium     Colon polyp      Priority: Medium     Family history of coronary artery disease      Priority: Medium        Past Medical/Surgical History:  Past Medical History:   Diagnosis Date     ADD (attention deficit hyperactivity disorder, inattentive type) 1997     CAD (coronary artery disease) 06/2015    REMBERTO x 2 = LAD  EF 35-40% - Dr Cheney     Colon polyp 04/2015    tubular adenoma - due 5 yrs     Controlled substance agreement signed 08/2016     Family history of coronary artery disease      Hyperlipidemia LDL goal <70      Medication management      Snoring     [facility]     Past Surgical History:   Procedure Laterality Date     COLONOSCOPY  04/01/2015    polyp x 1 6 mm - rectum - tubular adenoma - due 5 yrs     COLONOSCOPY N/A 09/22/2021    polyp x 1 - inflammatory - due 5 yrs     HEART CATH STENT COR W/WO PTCA  06/24/2015    REMBERTO x2 mid LAD, jailed diagonal     STRESS ECHO (METRO)  06/01/2015    abnormal - followed by abnormal angiogram - LAD occlusion     VASECTOMY  11/01/2011    dr perez       Social History:  Social History     Socioeconomic History     Marital status:      Spouse name: Nayely     Number of children: 3     Years of education: 16     Highest education level: Not on file   Occupational History     Employer: [employer]   Tobacco Use     Smoking status: Never     Smokeless tobacco: Never   Vaping Use     Vaping  Use: Never used   Substance and Sexual Activity     Alcohol use: Yes     Alcohol/week: 0.0 - 1.0 standard drinks of alcohol     Comment: casual     Drug use: No     Sexual activity: Yes     Partners: Female   Other Topics Concern      Service Not Asked     Blood Transfusions Not Asked     Caffeine Concern No     Comment: 2 cups daily, occas pop     Occupational Exposure Not Asked     Hobby Hazards Not Asked     Sleep Concern Not Asked     Stress Concern Not Asked     Weight Concern Not Asked     Special Diet Not Asked     Back Care Not Asked     Exercise Yes     Comment: 1-3/wk     Bike Helmet Not Asked     Seat Belt Yes     Self-Exams Not Asked     Parent/sibling w/ CABG, MI or angioplasty before 65F 55M? No   Social History Narrative     Not on file     Social Determinants of Health     Financial Resource Strain: Not on file   Food Insecurity: Not on file   Transportation Needs: Not on file   Physical Activity: Not on file   Stress: Not on file   Social Connections: Not on file   Intimate Partner Violence: Not on file   Housing Stability: Not on file       Family History:  Family History   Problem Relation Age of Onset     Chronic Obstructive Pulmonary Disease Mother         smoker     ALS Father         smoker     Cystic Fibrosis Son      C.A.D. Maternal Uncle         stents x 3     C.A.D. Paternal Uncle         MI at 56     C.A.D. Maternal Grandfather         age 56     C.A.D. Paternal Grandfather         age 60     Coronary Artery Disease Maternal Grandmother      Colon Cancer No family hx of        Review of Systems:   A complete review of systems reviewed by me is negative with the exeption of what has been mentioned in the history of present illness.  In the last TWO WEEKS have you experienced any of the following symptoms?  Fevers: No  Night Sweats: No  Weight Gain: No  Pain at Night: No  Double Vision: No  Changes in Vision: No  Difficulty Breathing through Nose: No  Sore Throat in Morning: No  Dry  "Mouth in the Morning: Yes  Shortness of Breath Lying Flat: No  Shortness of Breath With Activity: No  Awakening with Shortness of Breath: No  Increased Cough: No  Heart Racing at Night: No  Swelling in Feet or Legs: No  Diarrhea at Night: No  Heartburn at Night: No  Urinating More than Once at Night: No  Losing Control of Urine at Night: No  Joint Pains at Night: No  Headaches in Morning: No  Weakness in Arms or Legs: No  Depressed Mood: No  Anxiety: No     Physical Exam:   Vitals: /64   Pulse 63   Ht 1.753 m (5' 9\")   Wt 97.5 kg (214 lb 14.4 oz)   BMI 31.74 kg/m    BMI= Body mass index is 31.74 kg/m .  Neck Cir (cm): 43 cm  GENERAL: Healthy, alert and no distress  EYES: Eyes grossly normal to inspection.  No discharge or erythema, or obvious scleral/conjunctival abnormalities.  RESP: No audible wheeze, cough, or visible cyanosis.  No visible retractions or increased work of breathing.    SKIN: Visible skin clear. No significant rash, abnormal pigmentation or lesions.  NEURO: Cranial nerves grossly intact.  Mentation and speech appropriate for age.  PSYCH: Mentation appears normal, affect normal/bright, judgement and insight intact, normal speech and appearance well-groomed.         Data: All pertinent previous laboratory data reviewed     Recent Labs   Lab Test 08/02/22  0756 06/08/21  0827    137   POTASSIUM 4.2 5.2   CHLORIDE 106 107   CO2 23 28   ANIONGAP 9 2*   * 90   BUN 17 17   CR 0.85 0.92   ALYSON 9.0 9.1       Recent Labs   Lab Test 08/02/22  0756   WBC 5.7   RBC 5.05   HGB 15.0   HCT 43.2   MCV 86   MCH 29.7   MCHC 34.7   RDW 12.7          Recent Labs   Lab Test 08/02/22  0756   PROTTOTAL 6.7*   ALBUMIN 3.7   BILITOTAL 0.5   ALKPHOS 61   AST 21   ALT 46       TSH (mU/L)   Date Value   08/02/2022 2.36   06/08/2021 2.35   10/15/2019 1.68       No results found for: UAMP, UBARB, BENZODIAZEUR, UCANN, UCOC, OPIT, UPCP    No results found for: IRONSAT, MF58095, CODY    No results found " for: PH, PHARTERIAL, PO2, UR4MWNVNCAG, SAT, PCO2, HCO3, BASEEXCESS, TAVIA, BEB    @LABRCNTIPR(phv:4,pco2v:4,po2v:4,hco3v:4,dionisio:4,o2per:4)@    Echocardiology: No results found for this or any previous visit (from the past 4320 hour(s)).    Chest x-ray: No results found for this or any previous visit from the past 365 days.      Chest CT: No results found for this or any previous visit from the past 365 days.      PFT: Most Recent [facility] Pulmonary Function Testing  No results found for: 20001    Assessment and Plan:     Problem List Items Addressed This Visit        Respiratory    Snoring    Obstructive sleep apnea - Primary     STOP BANG score is 5/8. Patient likely has sleep apnea based on clinical history of snoring, EDS, age, neck circumference, gender, insomnia, snort arousals, morning dry mouth, unrefreshing sleep, and sleep inertia .   Obstructive sleep apnea reviewed. Complications of Untreated Sleep Apnea Reviewed.  HST/ Polysomnography reviewed. Information provided regarding treatment options for ARJUN.    Recommend in-lab sleep study due to previous negative HST and progressive symptoms consistent with ARJUN            Relevant Medications    zolpidem (AMBIEN) 10 MG tablet    Other Relevant Orders    Comprehensive Sleep Study     Patient was strongly advised to avoid driving, operating any heavy machinery or other hazardous situations while drowsy or sleepy.  Patient was counseled on the importance of driving while alert, to pull over if drowsy, or nap before getting into the vehicle if sleepy.      Plan is for Ernesto Soliman to follow up in about 2 week(s) following PSG for results.     The above note was dictated using voice recognition software. Although reviewed after completion, some word and grammatical error may remain . Please contact the author for any clarifications.    Total time spent reviewing medical records, history and physical examination, review of previous testing and interpretation as well  as documentation on this date, 06/21/23: 37 minutes    Ender Hyatt MD on 10/20/2022   [facility]   Floor 1, [address]   [address] [place] [address]   Appointments: [phone] [facility]   Floor 1, [address]   [address] [facility] [place] [address]   Appointments: [phone]     CC: Andrews Whiteside

## 2023-06-21 NOTE — PATIENT INSTRUCTIONS
"MY INFORMATION ON SLEEP APNEA-  Ernesto Soliman    DOCTOR : Ender Hyatt MD  SLEEP CENTER :      MY CONTACT NUMBER:    [facility]   [phone]  [facility]    Am I having a sleep study at a sleep center?  Make sure you have an appointment for the study before you leave!  [email or web address]    Am I having a home sleep study?  Watch the video for the device you are using:  /drop off device, Noxturnal T-3: [email or web address]/[email or web address]?v=yGGFBdELGhk  Disposable device sent out require phone/computer application, WatchPAT1: [email or web address]?v=BCce_vbiwxE  The sleep lab will call you for this appointment   If you wish to reschedule the sleep study or contact the sleep lab scheduling call [phone]        Frequently asked questions:  1. What is Obstructive Sleep Apnea (ARJUN)? ARJUN is the most common type of sleep apnea. Apnea means, \"without breath.\"  Apnea is most often caused by narrowing or collapse of the upper airway as muscles relax during sleep.   Almost everyone has occasional apneas. Most people with sleep apnea have had brief interruptions at night frequently for many years.  The severity of sleep apnea is related to how frequent and severe the events are.   Apneas are any events where there is no breathing.  Hypopneas are any events where there is shallow breathing. Sleep studies will track and then add all of the apneas and hypopneas into a total and then divided this number by the total time asleep to obtain the apnea hypopnea index(AHI). 0-5 events/per hour = No Sleep Apnea; 5-15 events/per hour = Mild Sleep Apnea; 15-30 events/per hour = Moderate Sleep Apnea; Greater than 30 events/per hour = Severe Sleep Apnea.  Sleep apnea is typically worse during REM sleep due to complete muscle relaxation, including the muscles of the airway. Sleep apnea is also typically worse " during supine(sleeping on your back) sleep due to internal structures more easily falling on the top of the airway and external pressures more easily crushing the airway.  The respiratory disturbance index(RDI) includes apneas, hypopneas, and other respiratory events such as snoring that may disturb your sleep.  2. What are the consequences of ARJUN? Symptoms include: feeling sleepy during the day, snoring loudly, gasping or stopping of breathing, trouble sleeping, and occasionally morning headaches or heartburn at night.  Sleepiness can be serious and even increase the risk of falling asleep while driving. Other health consequences may include development of high blood pressure and other cardiovascular disease in persons who are susceptible. Untreated ARJUN  can contribute to heart disease, stroke and diabetes.   3. What are the treatment options? In most situations, sleep apnea is a lifelong disease that must be managed with daily therapy. Medications are not effective for sleep apnea and surgery is generally not considered until other therapies have been tried. Your treatment is your choice . Continuous Positive Airway (CPAP) works right away and is the therapy that is effective in nearly everyone. An oral device to hold your jaw forward is usually the next most reliable option. Other options include postioning devices (to keep you off your back), weight loss, and surgery including a tongue pacing device. There is more detail about some of these options below.    Important tips for using CPAP and similar devices   Know your equipment:  CPAP is continuous positive airway pressure that prevents obstructive sleep apnea by keeping the throat from collapsing while you are sleeping. In most cases, the device is  smart  and can slowly self-adjusts if your throat collapses and keeps a record every day of how well you are treated-this information is available to you and your care team.  BPAP is bilevel positive airway  pressure that keeps your throat open and also assists each breath with a pressure boost to maintain adequate breathing.  Special kinds of BPAP are used in patients who have inadequate breathing from lung or heart disease. In most cases, the device is  smart  and can slowly self-adjusts to assist breathing. Like CPAP, the device keeps a record of how well you are treated.  Your mask is your connection to the device. You get to choose what feels most comfortable and the staff will help to make sure if fits. Here: are some examples of the different masks that are available:       Key points to remember on your journey with sleep apnea:  Sleep study.  PAP devices often need to be adjusted during a sleep study to show that they are effective and adjusted right.  Good tips to remember: Try wearing just the mask during a quiet time during the day so your body adapts to wearing it. A humidifier is recommended for comfort in most cases to prevent drying of your nose and throat. Allergy medication from your provider may help you if you are having nasal congestion.  Getting settled-in. It takes more than one night for most of us to get used to wearing a mask. Try wearing just the mask during a quiet time during the day so your body adapts to wearing it. A humidifier is recommended for comfort in most cases. Our team will work with you carefully on the first day and will be in contact within 4 days and again at 2 and 4 weeks for advice and remote device adjustments. Your therapy is evaluated by the device each day.   Use it every night. The more you are able to sleep naturally for 7-8 hours, the more likely you will have good sleep and to prevent health risks or symptoms from sleep apnea. Even if you use it 4 hours it helps. Occasionally all of us are unable to use a medical therapy, in sleep apnea, it is not dangerous to miss one night.   Communicate. Call our skilled team on the number provided on the first day if your visit  for problems that make it difficult to wear the device. Over 2 out of 3 patients can learn to wear the device long-term with help from our team. Remember to call our team or your sleep providers if you are unable to wear the device as we may have other solutions for those who cannot adapt to mask CPAP therapy. It is recommended that you sleep your sleep provider within the first 3 months and yearly after that if you are not having problems.   Take care of your equipment. Make sure you clean your mask and tubing using directions every day and that your filter and mask are replaced as recommended or if they are not working.     BESIDES CPAP, WHAT OTHER THERAPIES ARE THERE?    Positioning Device  Positioning devices are generally used when sleep apnea is mild and only occurs on your back.This example shows a pillow that straps around the waist. It may be appropriate for those whose sleep study shows milder sleep apnea that occurs primarily when lying flat on one's back. Preliminary studies have shown benefit but effectiveness at home may need to be verified by a home sleep test. These devices are generally not covered by medical insurance.    Oral Appliance  What is oral appliance therapy?  An oral appliance device fits on your teeth at night like a retainer used after having braces. The device is made by a specialized dentist and requires several visits over 1-2 months before a manufactured device is made to fit your teeth and is adjusted to prevent your sleep apnea. Once an oral device is working properly, snoring should be improved. A home sleep test may be recommended at that time if to determine whether the sleep apnea is adequately treated.       Some things to remember:  -Oral devices are often, but not always, covered by your medical insurance. Be sure to check with your insurance provider.   -If you are referred for oral therapy, you will be given a list of specialized dentists to consider or you may choose to  visit the Web site of the American Academy of Dental Sleep Medicine  -Oral devices are less likely to work if you have severe sleep apnea or are extremely overweight.     More detailed information  An oral appliance is a small acrylic device that fits over the upper and lower teeth  (similar to a retainer or a mouth guard). This device slightly moves jaw forward, which moves the base of the tongue forward, opens the airway, improves breathing for effective treat snoring and obstructive sleep apnea in perhaps 7 out of 10 people .  The best working devices are custom-made by a dental device  after a mold is made of the teeth 1, 2, 3.  When is an oral appliance indicated?  Oral appliance therapy is recommended as a first-line treatment for patients with primary snoring, mild sleep apnea, and for patients with moderate sleep apnea who prefer appliance therapy to use of CPAP4, 5. Severity of sleep apnea is determined by sleep testing and is based on the number of respiratory events per hour of sleep.   How successful is oral appliance therapy?  The success rate of oral appliance therapy in patients with mild sleep apnea is 75-80% while in patients with moderate sleep apnea it is 50-70%. The chance of success in patients with severe sleep apnea is 40-50%. The research also shows that oral appliances have a beneficial effect on the cardiovascular health of ARJUN patients at the same magnitude as CPAP therapy7.  Oral appliances should be a second-line treatment in cases of severe sleep apnea, but if not completely successful then a combination therapy utilizing CPAP plus oral appliance therapy may be effective. Oral appliances tend to be effective in a broad range of patients although studies show that the patients who have the highest success are females, younger patients, those with milder disease, and less severe obesity. 3, 6.   Finding a dentist that practices dental sleep medicine  Specific training is  available through the American Academy of Dental Sleep Medicine for dentists interested in working in the field of sleep. To find a dentist who is educated in the field of sleep and the use of oral appliances, near you, visit the Web site of the American Academy of Dental Sleep Medicine.    References  1. Aaron et al. Objectively measured vs self-reported compliance during oral appliance therapy for sleep-disordered breathing. Chest 2013; 144(5): 9662-7353.  2. Sergio, et al. Objective measurement of compliance during oral appliance therapy for sleep-disordered breathing. Thorax 2013; 68(1): 91-96.  3. Charisse et al. Mandibular advancement devices in 620 men and women with ARJUN and snoring: tolerability and predictors of treatment success. Chest 2004; 125: 4499-7409.  4. Kristin et al. Oral appliances for snoring and ARJUN: a review. Sleep 2006; 29: 244-262.  5. Loreto, et al. Oral appliance treatment for ARJUN: an update. J Clin Sleep Med 2014; 10(2): 215-227.  6. Domonique et al. Predictors of OSAH treatment outcome. J Dent Res 2007; 86: 2882-9995.      Weight Loss:    Weight loss is a long-term strategy that may improve sleep apnea in some patients.    Weight management is a personal decision.  If you are interested in exploring weight loss strategies, the following discussion covers the impact on weight loss on sleep apnea and the approaches that may be successful.    Weight loss decreases severity of sleep apnea in most people with obesity. For those with mild obesity who have developed snoring with weight gain, even 15-30 pound weight loss can improve and occasionally eliminate sleep apnea.  Structured and life-long dietary and health habits are necessary to lose weight and keep healthier weight levels.     Though there may be significant health benefits from weight loss, long-term weight loss is very difficult to achieve- studies show success with dietary management in less than 10% of people. In  addition, substantial weight loss may require years of dietary control and may be difficult if patients have severe obesity. In these cases, surgical management may be considered.  Finally, older individuals who have tolerated obesity without health complications may be less likely to benefit from weight loss strategies.    Your BMI is Body mass index is 31.74 kg/m .    Weight management plan: Discussed healthy diet and exercise guidelines    Surgery:    Surgery for obstructive sleep apnea is considered generally only when other therapies fail to work. Surgery may be discussed with you if you are having a difficult time tolerating CPAP and or when there is an abnormal structure that requires surgical correction.  Nose and throat surgeries often enlarge the airway to prevent collapse.  Most of these surgeries create pain for 1-2 weeks and up to half of the most common surgeries are not effective throughout life.  You should carefully discuss the benefits and drawbacks to surgery with your sleep provider and surgeon to determine if it is the best solution for you.   More information  Surgery for ARJUN is directed at areas that are responsible for narrowing or complete obstruction of the airway during sleep.  There are a wide range of procedures available to enlarge and/or stabilize the airway to prevent blockage of breathing in the three major areas where it can occur: the palate, tongue, and nasal regions.  Successful surgical treatment depends on the accurate identification of the factors responsible for obstructive sleep apnea in each person.  A personalized approach is required because there is no single treatment that works well for everyone.  Because of anatomic variation, consultation with an examination by a sleep surgeon is a critical first step in determining what surgical options are best for each patient.  In some cases, examination during sedation may be recommended in order to guide the selection of  procedures.  Patients will be counseled about risks and benefits as well as the typical recovery course after surgery. Surgery is typically not a cure for a person s ARJUN.  However, surgery will often significantly improve one s ARJUN severity (termed  success rate ).  Even in the absence of a cure, surgery will decrease the cardiovascular risk associated with OSA7; improve overall quality of life8 (sleepiness, functionality, sleep quality, etc).      Palate Procedures:  Patients with ARJUN often have narrowing of their airway in the region of their tonsils and uvula.  The goals of palate procedures are to widen the airway in this region as well as to help the tissues resist collapse.  Modern palate procedure techniques focus on tissue conservation and soft tissue rearrangement, rather than tissue removal.  Often the uvula is preserved in this procedure. Residual sleep apnea is common in patient after pharyngoplasty with an average reduction in sleep apnea events of 33%2.      Tongue Procedures:  ExamWhile patients are awake, the muscles that surround the throat are active and keep this region open for breathing. These muscles relax during sleep, allowing the tongue and other structures to collapse and block breathing.  There are several different tongue procedures available.  Selection of a tongue base procedure depends on characteristics seen on physical exam.  Generally, procedures are aimed at removing bulky tissues in this area or preventing the back of the tongue from falling back during sleep.  Success rates for tongue surgery range from 50-62%3.    Hypoglossal Nerve Stimulation:  Hypoglossal nerve stimulation has recently received approval from the [place] Food and Drug Administration for the treatment of obstructive sleep apnea.  This is based on research showing that the system was safe and effective in treating sleep apnea6.  Results showed that the median AHI score decreased 68%, from 29.3 to 9.0. This  therapy uses an implant system that senses breathing patterns and delivers mild stimulation to airway muscles, which keeps the airway open during sleep.  The system consists of three fully implanted components: a small generator (similar in size to a pacemaker), a breathing sensor, and a stimulation lead.  Using a small handheld remote, a patient turns the therapy on before bed and off upon awakening.    Candidates for this device must be greater than 22 years of age, have moderate to severe ARJUN (AHI between 20-65), BMI less than 32, have tried CPAP/oral appliance without success, and have appropriate upper airway anatomy (determined by a sleep endoscopy performed by Dr. Young).    Hypoglossal Nerve Stimulation Pathway:    The sleep surgeon s office will work with the patient through the insurance prior-authorization process (including communications and appeals).    Nasal Procedures:  Nasal obstruction can interfere with nasal breathing during the day and night.  Studies have shown that relief of nasal obstruction can improve the ability of some patients to tolerate positive airway pressure therapy for obstructive sleep apnea1.  Treatment options include medications such as nasal saline, topical corticosteroid and antihistamine sprays, and oral medications such as antihistamines or decongestants. Non-surgical treatments can include external nasal dilators for selected patients. If these are not successful by themselves, surgery can improve the nasal airway either alone or in combination with these other options.      Combination Procedures:  Combination of surgical procedures and other treatments may be recommended, particularly if patients have more than one area of narrowing or persistent positional disease.  The success rate of combination surgery ranges from 66-80%2,3.    References  Benjamin CORDERO. The Role of the Nose in Snoring and Obstructive Sleep Apnoea: An Update.  Eur Arch Otorhinolaryngol. 2011; 268:  1365-73.   Nate SM; Thomas JA; Sudhir JR; Pallanch JF; Nuris MB; Crissy SG; Yeni SLOAN. Surgical modifications of the upper airway for obstructive sleep apnea in adults: a systematic review and meta-analysis. SLEEP 2010;33(10):7888-0715. Anthony NAGEL. Hypopharyngeal surgery in obstructive sleep apnea: an evidence-based medicine review.  Arch Otolaryngol Head Neck Surg. 2006 Feb;132(2):206-13.  César YH1, Staci Y, Omid HYUN. The efficacy of anatomically based multilevel surgery for obstructive sleep apnea. Otolaryngol Head Neck Surg. 2003 Oct;129(4):327-35.  Anthony NAGEL, Goldberg A. Hypopharyngeal Surgery in Obstructive Sleep Apnea: An Evidence-Based Medicine Review. Arch Otolaryngol Head Neck Surg. 2006 Feb;132(2):206-13.  Cheryl KAPOOR et al. Upper-Airway Stimulation for Obstructive Sleep Apnea.  N Engl J Med. 2014 Jan 9;370(2):139-49.  Bita Y et al. Increased Incidence of Cardiovascular Disease in Middle-aged Men with Obstructive Sleep Apnea. Am J Respir Crit Care Med; 2002 166: 159-165  Blackwood EM et al. Studying Life Effects and Effectiveness of Palatopharyngoplasty (SLEEP) study: Subjective Outcomes of Isolated Uvulopalatopharyngoplasty. Otolaryngol Head Neck Surg. 2011; 144: 623-631.

## 2023-06-21 NOTE — ASSESSMENT & PLAN NOTE
STOP BANG score is 5/8. Patient likely has sleep apnea based on clinical history of snoring, EDS, age, neck circumference, gender, insomnia, snort arousals, morning dry mouth, unrefreshing sleep, and sleep inertia .   Obstructive sleep apnea reviewed. Complications of Untreated Sleep Apnea Reviewed.  HST/ Polysomnography reviewed. Information provided regarding treatment options for ARJUN.    Recommend in-lab sleep study due to previous negative HST and progressive symptoms consistent with ARJUN

## 2023-07-11 ENCOUNTER — PATIENT OUTREACH (OUTPATIENT)
Dept: CARE COORDINATION | Facility: CLINIC | Age: 58
End: 2023-07-11
Payer: COMMERCIAL

## 2023-07-25 ENCOUNTER — PATIENT OUTREACH (OUTPATIENT)
Dept: CARE COORDINATION | Facility: CLINIC | Age: 58
End: 2023-07-25
Payer: COMMERCIAL

## 2023-08-10 ENCOUNTER — THERAPY VISIT (OUTPATIENT)
Dept: SLEEP MEDICINE | Facility: CLINIC | Age: 58
End: 2023-08-10
Payer: COMMERCIAL

## 2023-08-10 DIAGNOSIS — G47.33 OBSTRUCTIVE SLEEP APNEA: ICD-10-CM

## 2023-08-10 RX ORDER — ZOLPIDEM TARTRATE 10 MG/1
10 TABLET ORAL
Qty: 1 TABLET | Refills: 0 | Status: SHIPPED | OUTPATIENT
Start: 2023-08-10 | End: 2024-04-12

## 2023-08-10 ASSESSMENT — SLEEP AND FATIGUE QUESTIONNAIRES
HOW LIKELY ARE YOU TO NOD OFF OR FALL ASLEEP WHILE SITTING INACTIVE IN A PUBLIC PLACE: WOULD NEVER DOZE
HOW LIKELY ARE YOU TO NOD OFF OR FALL ASLEEP IN A CAR, WHILE STOPPED FOR A FEW MINUTES IN TRAFFIC: WOULD NEVER DOZE
HOW LIKELY ARE YOU TO NOD OFF OR FALL ASLEEP WHILE SITTING QUIETLY AFTER LUNCH WITHOUT ALCOHOL: WOULD NEVER DOZE
HOW LIKELY ARE YOU TO NOD OFF OR FALL ASLEEP WHILE SITTING AND TALKING TO SOMEONE: WOULD NEVER DOZE
HOW LIKELY ARE YOU TO NOD OFF OR FALL ASLEEP WHEN YOU ARE A PASSENGER IN A CAR FOR AN HOUR WITHOUT A BREAK: WOULD NEVER DOZE
HOW LIKELY ARE YOU TO NOD OFF OR FALL ASLEEP WHILE SITTING AND READING: MODERATE CHANCE OF DOZING
HOW LIKELY ARE YOU TO NOD OFF OR FALL ASLEEP WHILE LYING DOWN TO REST IN THE AFTERNOON WHEN CIRCUMSTANCES PERMIT: MODERATE CHANCE OF DOZING
HOW LIKELY ARE YOU TO NOD OFF OR FALL ASLEEP WHILE WATCHING TV: SLIGHT CHANCE OF DOZING

## 2023-08-10 NOTE — TELEPHONE ENCOUNTER
Patient requesting an Ambien for tonight's study.  Was ordered and printed on 6/21/23, no documentation about it being given to the patient.   shows it was never filled.  Pended for provider consideration.

## 2023-08-11 NOTE — PATIENT INSTRUCTIONS
[place] [facility]    1. Your sleep study will be reviewed by a sleep physician within the next few days.     2. Please follow up in the sleep clinic as scheduled, or, make an appointment with your sleep provider to be seen within two weeks to discuss the results of the sleep study.    3. If you have any questions or problems with your treatment plan, please contact your sleep clinic provider at [phone] to further manage your condition.    4. Please review your attached medication list, and, at your follow-up appointment advise your sleep clinic provider about any changes.    5. Go to [email or web address] for more information about your sleep problems.    Livia Etienne  August 11, 2023

## 2023-08-29 LAB — SLPCOMP: NORMAL

## 2023-08-29 NOTE — PROCEDURES
"     SLEEP STUDY INTERPRETATION  DIAGNOSTIC POLYSOMNOGRAPHY REPORT      Patient: MARC LOUIS  YOB: 1965  Study Date: 8/10/2023  MRN: [id number]  Referring Provider: Andrews Whiteside MD  Ordering Provider: Ender Hyatt MD    Indications for Polysomnography: The patient is a 57 year old Male who is 5' 9\" and weighs 214.0 lbs. His BMI is 31.7, Roca sleepiness scale 1/24 and neck circumference is 43 cm. Relevant medical history includes CAD s/p PCI, HLD. A diagnostic polysomnogram was performed to evaluate for sleep apnea/PLMS/RLS/RBD/S-S/CSA/hypoventilation/hypoxemia.    Polysomnogram Data: A full night polysomnogram recorded the standard physiologic parameters including EEG, EOG, EMG, ECG, nasal and oral airflow. Respiratory parameters of chest and abdominal movements were recorded with respiratory inductance plethysmography. Oxygen saturation was recorded by pulse oximetry. Hypopnea scoring rule used: 1B 4%.    Sleep Architecture: Sleep was fragmented and sleep efficiency was decreased due to prolonged sleep latency, increased arousal index, and prolonged wake after sleep onset. All stages of sleep were observed.  The total recording time of the polysomnogram was 415.0 minutes. The total sleep time was 307.5 minutes. Sleep latency was increased at 21.0 minutes without the use of a sleep aid. REM latency was 57.5 minutes. Arousal index was increased at 61.3 arousals per hour. Sleep efficiency was decreased at 74.1%. Wake after sleep onset was 86.5 minutes. The patient spent 22.0% of total sleep time in Stage N1, 37.1% in Stage N2, 17.2% in Stage N3, and 23.7% in REM. Time in REM supine was 73.0 minutes.    Respiration: Mild obstructive sleep apnea was observed. There was no sleep associated hypoxemia.     Events ? The polysomnogram revealed a presence of 17 obstructive, - central, and - mixed apneas resulting in an apnea index of 3.3 events per hour. There were 11 obstructive hypopneas and - " central hypopneas resulting in an obstructive hypopnea index of 2.1 and central hypopnea index of - events per hour. The combined apnea/hypopnea index was 5.5 events per hour (central apnea/hypopnea index was - events per hour). The REM AHI was 14.8 events per hour. The supine AHI was 5.5 events per hour. The RERA index was 2.5 events per hour.  The RDI was 8.0 events per hour.    Snoring - was reported as loud/intermittent.    Respiratory rate and pattern - was notable for normal respiratory rate and pattern.    Sustained Sleep Associated Hypoventilation - Transcutaneous carbon dioxide monitoring was not used.    Sleep Associated Hypoxemia - (Greater than 5 minutes O2 sat at or below 88%) was not present. Baseline oxygen saturation was 92.0%. Lowest oxygen saturation was 83.0%. Time spent less than or equal to 88% was 0.6 minutes. Time spent less than or equal to 89% was 3.3 minutes.    Movement Activity: There were no abnormal sleep movements or behaviors observed.     Periodic Limb Activity - There were - PLMs during the entire study. The PLM index was - movements per hour. The PLM Arousal Index was - per hour.    REM EMG Activity - Excessive transient/sustained muscle activity was not present.    Nocturnal Behavior - Abnormal sleep related behaviors were not noted during/arising out of NREM / REM sleep.    Bruxism - None apparent.    Cardiac Summary: Normal Sinus Rhythm   The average pulse rate was 59.2 bpm. The minimum pulse rate was 48.0 bpm while the maximum pulse rate was 105.0 bpm.  Arrhythmias were not noted.            Assessment:     Sleep was fragmented and sleep efficiency was decreased due to prolonged sleep latency, increased arousal index, and prolonged wake after sleep onset. All stages of sleep were observed.    Mild obstructive sleep apnea was observed. There was no sleep associated hypoxemia.     There were no abnormal sleep movements or behaviors observed.     Normal Sinus Rhythm      Recommendations:    Based on the presence of mild/moderate obstructive sleep apnea and excessive daytime sleepiness, treatment could be empirically initiated with Auto?titrating PAP therapy with a range of 5 to 15 cmH2O. Recommend clinical follow up with sleep management team.    Patient may be a candidate for dental appliance through referral to Sleep Dentistry for the treatment of obstructive sleep apnea and/or socially disruptive snoring.    Advice regarding the risks of drowsy driving.    Suggest optimizing sleep schedule and avoiding sleep deprivation.    Weight management (if BMI > 30).    Diagnostic Codes:   Obstructive Sleep Apnea G47.33  Repetitive Intrusions Into Sleep F51.8    8/10/2023 [place] [facility] Study (214.0 lbs) - AHI 5.5, RDI 8.0, Supine AHI 5.5, REM AHI 14.8, Low O2 83.0%, Time Spent ?88% 0.6 minutes / Time Spent ?89% 3.3 minutes.    _____________________________________   Electronically Signed By: Ender Hyatt MD 08/29/2023

## 2023-09-19 ASSESSMENT — SLEEP AND FATIGUE QUESTIONNAIRES
HOW LIKELY ARE YOU TO NOD OFF OR FALL ASLEEP WHILE SITTING INACTIVE IN A PUBLIC PLACE: WOULD NEVER DOZE
HOW LIKELY ARE YOU TO NOD OFF OR FALL ASLEEP WHILE SITTING QUIETLY AFTER LUNCH WITHOUT ALCOHOL: WOULD NEVER DOZE
HOW LIKELY ARE YOU TO NOD OFF OR FALL ASLEEP WHILE SITTING AND READING: SLIGHT CHANCE OF DOZING
HOW LIKELY ARE YOU TO NOD OFF OR FALL ASLEEP WHILE SITTING AND TALKING TO SOMEONE: WOULD NEVER DOZE
HOW LIKELY ARE YOU TO NOD OFF OR FALL ASLEEP WHILE WATCHING TV: SLIGHT CHANCE OF DOZING
HOW LIKELY ARE YOU TO NOD OFF OR FALL ASLEEP WHILE LYING DOWN TO REST IN THE AFTERNOON WHEN CIRCUMSTANCES PERMIT: SLIGHT CHANCE OF DOZING
HOW LIKELY ARE YOU TO NOD OFF OR FALL ASLEEP IN A CAR, WHILE STOPPED FOR A FEW MINUTES IN TRAFFIC: WOULD NEVER DOZE
HOW LIKELY ARE YOU TO NOD OFF OR FALL ASLEEP WHEN YOU ARE A PASSENGER IN A CAR FOR AN HOUR WITHOUT A BREAK: SLIGHT CHANCE OF DOZING

## 2023-09-20 ENCOUNTER — OFFICE VISIT (OUTPATIENT)
Dept: SLEEP MEDICINE | Facility: CLINIC | Age: 58
End: 2023-09-20
Payer: COMMERCIAL

## 2023-09-20 VITALS
DIASTOLIC BLOOD PRESSURE: 65 MMHG | BODY MASS INDEX: 31.84 KG/M2 | WEIGHT: 215 LBS | HEART RATE: 69 BPM | OXYGEN SATURATION: 97 % | SYSTOLIC BLOOD PRESSURE: 108 MMHG | HEIGHT: 69 IN

## 2023-09-20 DIAGNOSIS — G47.33 OBSTRUCTIVE SLEEP APNEA: Primary | ICD-10-CM

## 2023-09-20 PROCEDURE — 99214 OFFICE O/P EST MOD 30 MIN: CPT | Performed by: STUDENT IN AN ORGANIZED HEALTH CARE EDUCATION/TRAINING PROGRAM

## 2023-09-20 NOTE — PATIENT INSTRUCTIONS
MY INFORMATION ON SLEEP APNEA-  Ernesto Soliman    DOCTOR : Ender Hyatt MD  SLEEP CENTER :      MY CONTACT NUMBER:    [facility]  [phone]  [facility]      For general sleep health questions:   http://sleepeducation.org    For tips about PAP and COVID-19:  https://www.[email or web address].org/patients/patient-resources/resources/covid-19-and-home-pap-therapy.pdf    For general info about COVID-19 including vaccines:  [email or web address].[email or web address]      Continue PAP therapy every night, for all hours that you are sleeping (including naps.)  As always, try to get at least 8 hours of sleep or more each day, keep a regular sleep schedule, and avoid sleep deprivation. Avoid alcohol.  Reasons that you might need a change to your pressure therapy would be weight gain or loss, waking having inadvertently removed your PAP overnight, having previously felt refreshed by sleep with CPAP use and now waking un-refreshed, and return of daytime sleepiness. Also, the development of new medical problems  (such as heart failure, stroke, medications such as narcotics) can sometimes affect breathing at night and change your PAP therapy needs.  Please bring PAP with you if you are hospitalized.  If anticipating surgery be sure to discuss with your surgeon that you have sleep apnea and use PAP therapy.    Maintain your equipment as recommended which includes routine cleaning and replacement of supplies.      Call DME for any questions regarding supplies or maintenance.    [facility], [facility] [phone]    Do not drive on engage in potentially dangerous activities if feeling sleepy.  Please follow up in sleep clinic again in 3 months.        Tips for your PAP use-    Mask fitting tips  Mask fitting exercise:    To improve your mask seal and your mobility at night, put mask on and secure in place.  Lie down in bed with full pressure and  roll to one side, adjust headgear while in that position to eliminate any leaks. Repeat process rolling to other side.     The mask seal does not have to be perfect:   CPAP machines are designed to make up for small leaks. However, you will not tolerate leaks blowing in your eyes so you will need to adjust.   Any leak should only be near or at the bottom of the mask.  We expect your mask to leak slightly at night.    Do not over-tighten the headgear straps, tighter IS NOT better, we expect minimal leak.    First try re-positioning the mask or headgear before tightening the headgear straps.  Mask leaks are expected due to changing sleeping positions. Try pulling the mask away from your skin allowing the cushion to re-inflate will minimize the leak.  If you struggle for a good fit, try turning the CPAP off and then readjust the mask by pulling it away from your face and then turning back on the CPAP.        Humidifier tips  Humidifiers can be adjusted to increase or decrease the amount of moisture according to your comfort level. You may need to adjust this frequently at first, but then might only change it with seasonal weather changes.     Try INCREASING the humidity if:  You experience a dry, irritated nasal passage or throat.  You have a runny, drippy nose or sneezing fits after using CPAP.  You experience nasal congestion during or after CPAP use.    Try DECREASING the humidity if:  You have excessive condensation or  rain out  in the tubing or mask.  Otherwise keep the tubing warm during the night by running it underneath the blankets or pillow.      Clinic visit after initial PAP set-up   Bring your equipment with you to your 5-8 week follow up clinic visit.  We will be extracting your data from the machine if not available from the cloud based "[employer]".        Travel  Always take your equipment with you when you travel.  If you fly with your equipment bring it on with you as a carry on.  Medical equipment does  not count as a carry on.    If you travel international the machines take 110-240v.  The only adapter needed is the adapter that will fit into the receptacle (outlet).    You may also want to bring an extension cord as many hotel rooms have limited outlets at the bedside.  Do not travel with water in your humidifier chamber.     Cleaning and Maintenance Guidelines    Equipment Frequency Cleaning Method   Mask First Day    Daily      Weekly Soak mask in hot soapy water for 30 minutes, rinse and air dry.  Wipe nasal cushion with a hot soapy (Ivory, baby shampoo) cloth and rinse.  Baby wipes may also be used.  Do not use anti-bacterial soaps,Poonam  liquid soap, rubbing alcohol, bleach or ammonia.  Wash frame in hot soapy water (Ivory, baby shampoo) rinse and let air dry   Headgear Biweekly Wash in hot soapy water, rinse and air dry   Reusable Gray Filter Weekly Wash in hot soapy water, rinse, put in towel squeeze moisture out, let air dry   Disposable White Filter Check Weekly Replace when brown or gray in color; at least every 2 to 3 months   Humidifier Chamber Daily    Weekly Empty distilled water from humidifier and let air dry    Hand wash in hot soapy water, rinse and air dry   Tubing Weekly Wash in hot soapy water, rinse and let air dry   Mask, Tubing and Humidifier Chamber As needed Disinfect: Soak in 1 part distilled white vinegar to 3 parts hot water for 30 minutes, rinse well and air dry  Not the material headgear        MASK AND SUPPLY REORDERING and EQUIPMENT NEEDS through your DME and per your insurance  Reminder: Most insurance companies will allow for a new mask, headgear, tubing, and reusable gray filter every six months.  Disposable white ultra-fine filters are covered monthly.      HOME AND SAFETY INSTRUCTIONS  Do not use frayed or cracked electrical cords, multi plug adaptors, or switched receptacles  Do not immerse electrical equipment into water  Assure that electrical cords do not become a tripping  hazard

## 2023-09-20 NOTE — PROGRESS NOTES
[facility]  Sleep Study Follow-Up Visit:    Date on this visit: 9/20/2023    Primary Physician: Andrews Whiteside     History of present illness:  Ernesto Soliman is a 58 year old male patient with CAD s/p PCI, HLD who comes in today for follow-up of His sleep study done on 08/10/2023 at the [facility] for possible sleep apnea.    Sleep study showed Mild obstructive sleep apnea. Sleep related hypoxemia was not present.  Additional findings from the sleep study include AHI 5.5, REM AHI 14.8, supine AHI 5.5, RDI 8.0, and 0.6 minutes with oxygen saturation less than 88%.     These findings were reviewed with patient.     SCALES:  EPWORTH SLEEPINESS SCALE       9/19/2023     2:06 PM    [place] Sleepiness Scale ( MINAL Swenson  [id number]<br>ESS - [place]/English - Final version - 21 Nov 07 - [facility].)   Sitting and reading Slight chance of dozing   Watching TV Slight chance of dozing   Sitting, inactive in a public place (e.g. a theatre or a meeting) Would never doze   As a passenger in a car for an hour without a break Slight chance of dozing   Lying down to rest in the afternoon when circumstances permit Slight chance of dozing   Sitting and talking to someone Would never doze   Sitting quietly after a lunch without alcohol Would never doze   In a car, while stopped for a few minutes in traffic Would never doze   [place] Score (MC) 4   [place] Score (Sleep) 4       INSOMNIA SEVERITY INDEX (NAS)        9/19/2023     2:05 PM   Insomnia Severity Index (NAS)   Difficulty falling asleep 2   Difficulty staying asleep 1   Problems waking up too early 1   How SATISFIED/DISSATISFIED are you with your CURRENT sleep pattern? 3   How NOTICEABLE to others do you think your sleep problem is in terms of impairing the quality of your life? 4   How WORRIED/DISTRESSED are you about your current sleep problem? 3   To what extent do you consider your sleep problem to INTERFERE with your daily  functioning (e.g. daytime fatigue, mood, ability to function at work/daily chores, concentration, memory, mood, etc.) CURRENTLY? 3   NAS Total Score 17     Guidelines for Scoring/Interpretation:  Total score categories:  0-7 = No clinically significant insomnia   8-14 = Subthreshold insomnia   15-21 = Clinical insomnia (moderate severity)  22-28 = Clinical insomnia (severe)  Used via courtesy of www."[employer].va.gov with permission from Edwardo Elizabeth PhD., [facility]      Allergies:    No Known Allergies    Medications:    Current Outpatient Medications   Medication Sig Dispense Refill    aspirin (ASA) 81 MG tablet Take 1 tablet (81 mg) by mouth daily      atorvastatin (LIPITOR) 40 MG tablet Take 0.5 tablets (20 mg) by mouth daily 45 tablet 3    co-enzyme Q-10 100 MG CAPS capsule Take 200 mg by mouth daily      scopolamine (TRANSDERM) 1 MG/3DAYS 72 hr patch Place 1 patch onto the skin every 72 hours 4 patch 0    zinc gluconate 50 MG tablet Take 50 mg by mouth daily      zolpidem (AMBIEN) 10 MG tablet Take 1 tablet (10 mg) by mouth nightly as needed for sleep (take night of sleep) 1 tablet 0       Problem List:  Patient Active Problem List    Diagnosis Date Noted    Medication management      Priority: High    ADD (attention deficit hyperactivity disorder, inattentive type)      Priority: High    Obstructive sleep apnea 06/21/2023     Priority: Medium     Home Sleep Study Date: 1/6/2020  Sleep Associated Hypoxemia: sustained hypoxemia was not present. Baseline oxygen saturation was 91.8%.  Time with saturation less than or equal to 88% was 2.5 minutes. The lowest oxygen saturation was 85%.   Respiratory events: The home study revealed a apnea/hypopnea index [AHI] of 2.8 events per hour.  AHI was 3.5 per hour supine.      Class 1 obesity with serious comorbidity and body mass index (BMI) of 32.0 to 32.9 in adult, unspecified obesity type 06/08/2021     Priority: Medium    Snoring      Priority: Medium     Controlled substance agreement signed      Priority: Medium    Hyperlipidemia LDL goal <70 07/17/2015     Priority: Medium    Coronary artery disease involving native coronary artery      Priority: Medium    Colon polyp      Priority: Medium    Family history of coronary artery disease      Priority: Medium        Past Medical/Surgical History:  Past Medical History:   Diagnosis Date    ADD (attention deficit hyperactivity disorder, inattentive type) 1997    CAD (coronary artery disease) 06/2015    REMBERTO x 2 = LAD  EF 35-40% - Dr Cheney    Colon polyp 04/2015    tubular adenoma - due 5 yrs    Controlled substance agreement signed 08/2016    Family history of coronary artery disease     Hyperlipidemia LDL goal <70     Medication management     Snoring     [facility] - Bennett Goltz     Past Surgical History:   Procedure Laterality Date    COLONOSCOPY  04/01/2015    polyp x 1 6 mm - rectum - tubular adenoma - due 5 yrs    COLONOSCOPY N/A 09/22/2021    polyp x 1 - inflammatory - due 5 yrs    HEART CATH STENT COR W/WO PTCA  06/24/2015    REMBERTO x2 mid LAD, jailed diagonal    STRESS ECHO (METRO)  06/01/2015    abnormal - followed by abnormal angiogram - LAD occlusion    VASECTOMY  11/01/2011    dr perez       Social History:  Social History     Socioeconomic History    Marital status:      Spouse name: Nayely    Number of children: 3    Years of education: 16    Highest education level: Not on file   Occupational History     Employer: [employer]   Tobacco Use    Smoking status: Never    Smokeless tobacco: Never   Vaping Use    Vaping Use: Never used   Substance and Sexual Activity    Alcohol use: Yes     Alcohol/week: 0.0 - 1.0 standard drinks of alcohol     Comment: casual    Drug use: No    Sexual activity: Yes     Partners: Female   Other Topics Concern     Service Not Asked    Blood Transfusions Not Asked    Caffeine Concern No     Comment: 2 cups daily, occas pop    Occupational Exposure Not Asked     "Hobby Hazards Not Asked    Sleep Concern Not Asked    Stress Concern Not Asked    Weight Concern Not Asked    Special Diet Not Asked    Back Care Not Asked    Exercise Yes     Comment: 1-3/wk    Bike Helmet Not Asked    Seat Belt Yes    Self-Exams Not Asked    Parent/sibling w/ CABG, MI or angioplasty before 65F 55M? No   Social History Narrative    Not on file     Social Determinants of Health     Financial Resource Strain: Not on file   Food Insecurity: Not on file   Transportation Needs: Not on file   Physical Activity: Not on file   Stress: Not on file   Social Connections: Not on file   Interpersonal Safety: Not on file   Housing Stability: Not on file       Family History:  Family History   Problem Relation Age of Onset    Chronic Obstructive Pulmonary Disease Mother         smoker    ALS Father         smoker    Cystic Fibrosis Son     C.A.D. Maternal Uncle         stents x 3    C.A.D. Paternal Uncle         MI at 56    C.A.D. Maternal Grandfather         age 56    C.A.D. Paternal Grandfather         age 60    Coronary Artery Disease Maternal Grandmother     Colon Cancer No family hx of        Review of systems  A complete review of systems reviewed by me is negative with the exeption of what has been mentioned in the history of present illness.    Physical Examination:  Vitals: Ht 1.753 m (5' 9\")   Wt 97.5 kg (215 lb)   BMI 31.75 kg/m    BMI= Body mass index is 31.75 kg/m .     GENERAL: Healthy, alert and no distress  EYES: Eyes grossly normal to inspection.  No discharge or erythema, or obvious scleral/conjunctival abnormalities.  RESP: No audible wheeze, cough, or visible cyanosis.  No visible retractions or increased work of breathing.    SKIN: Visible skin clear. No significant rash, abnormal pigmentation or lesions.  NEURO: Cranial nerves grossly intact.  Mentation and speech appropriate for age.  PSYCH: Mentation appears normal, affect normal/bright, judgement and insight intact, normal speech and " appearance well-groomed.          Other tests/labs:   I have reviewed the labs and personally reviewed the imaging below and made my comment in the assessment and plan.    Assessment and Plan:  Problem List Items Addressed This Visit          Respiratory    Obstructive sleep apnea - Primary     Sleep study showed Mild obstructive sleep apnea. Sleep related hypoxemia was not present.  8/10/2023 [facility] Study (214.0 lbs) - AHI 5.5, RDI 8.0, Supine AHI 5.5, REM AHI 14.8, Low O2 83.0%, Time Spent ?88% 0.6 minutes / Time Spent ?89% 3.3 minutes.  Following discussion with patient a shared decision was made to begin therapy with auto-CPAP at 5-15 cmH2O.          Relevant Orders    [facility] COMPREHENSIVE DME (Completed)     Patient was strongly advised to avoid driving, operating any heavy machinery or other hazardous situations while drowsy or sleepy.  Patient was counseled on the importance of driving while alert, to pull over if drowsy, or nap before getting into the vehicle if sleepy.      Plan is for Ernesto Soliman to follow up in about 3 month(s).     The above note was dictated using voice recognition software. Although reviewed after completion, some word and grammatical error may remain . Please contact the author for any clarifications.    Total time spent reviewing medical records, history and physical examination, review of previous testing and interpretation as well as documentation on this date, 09/20/23: 35 minutes    Ender yHatt MD on 10/20/2022   [facility]   [address]oor 1, [address]   [address] [place] [address]   Appointments: [phone] [facility]   Floor 1, [address]   [address] [place] [address]   Appointments: [phone]     CC: No ref. provider found

## 2023-09-20 NOTE — ASSESSMENT & PLAN NOTE
Sleep study showed Mild obstructive sleep apnea. Sleep related hypoxemia was not present.  8/10/2023 [place] [facility] Sleep Study (214.0 lbs) - AHI 5.5, RDI 8.0, Supine AHI 5.5, REM AHI 14.8, Low O2 83.0%, Time Spent ?88% 0.6 minutes / Time Spent ?89% 3.3 minutes.  Following discussion with patient a shared decision was made to begin therapy with auto-CPAP at 5-15 cmH2O.

## 2023-09-23 ENCOUNTER — HEALTH MAINTENANCE LETTER (OUTPATIENT)
Age: 58
End: 2023-09-23

## 2023-09-28 ENCOUNTER — DOCUMENTATION ONLY (OUTPATIENT)
Dept: SLEEP MEDICINE | Facility: CLINIC | Age: 58
End: 2023-09-28
Payer: COMMERCIAL

## 2023-09-28 DIAGNOSIS — G47.33 OSA (OBSTRUCTIVE SLEEP APNEA): Primary | ICD-10-CM

## 2023-09-28 NOTE — PROGRESS NOTES
Patient was offered choice of vendor and chose [facility].  Patient Ernesto Soliman was set up at [place] on September 28, 2023. Patient received a Resmed Airsense 11 Pressures were set at  5-15 cm H2O.   Patient s ramp is 5 cm H2O for Off and FLEX/EPR is 2.  Patient received a Resmed Mask name: AIRFIT F20  Full Face mask size Medium, heated tubing and heated humidifier.  Patient has the following compliance requirements: using and visit requirements  Patient has a follow up on TBD with Dr Hyatt.    Jose Eduardo Hoskins

## 2023-10-02 ENCOUNTER — DOCUMENTATION ONLY (OUTPATIENT)
Dept: SLEEP MEDICINE | Facility: CLINIC | Age: 58
End: 2023-10-02
Payer: COMMERCIAL

## 2023-10-02 NOTE — PROGRESS NOTES
3 day Sleep therapy management telephone visit    Diagnostic AHI: 5.5 PSG    Confirmed with patient at time of call- N/A Patient is still interested in STM service       Message left for patient to return call.        Objective data     Order Settings for PAP  CPAP min     CPAP max              Device settings from machine CPAP min 5     CPAP max 15                      Assessment: Nighty usage most nights over four hours      Action plan: Patient to have 14 day STM visit. Patient has a follow up visit scheduled:   no    Replacement device: No  STM ordered by provider: Yes     Total time spent on accessing and  interpreting remote patient PAP therapy data  10 minutes    Total time spent counseling, coaching  and reviewing PAP therapy data with patient  1 minutes    [id number] no

## 2023-10-13 ENCOUNTER — DOCUMENTATION ONLY (OUTPATIENT)
Dept: SLEEP MEDICINE | Facility: CLINIC | Age: 58
End: 2023-10-13
Payer: COMMERCIAL

## 2023-10-13 NOTE — PROGRESS NOTES
14  DAY STM VISIT    Diagnostic AHI: 5.5  PSG    Subjective measures: Patient reports things are going well with CPAP and denies any questions or concerns. Patient was given my contact information to call if they have any questions.   Pt was given my contact information and instructed to reach out with any questions or concerns.       Assessment: Pt meeting objective benchmarks.  Patient meeting subjective benchmarks.   Action plan: pt to have 30 day STM visit.      Device type: Auto-CPAP    PAP settings:  DEVICE TYPE CPAP MIN CPAP MAX 95TH % PRESSURE EPR MASK DISPENSED   Auto-CPAP    5.0 cm  H20 15.0 cm  H20 11.1 cm  H20  TWO Per patient choice     Mask type:  Per patient choice    Objective measures: 14 day rolling measures   COMPLIANCE LEAK AHI AVERAGE USE IN MINUTES   85 % 13.03 0.92 296   GOAL >70% GOAL < 24 LPM GOAL <5 GOAL >240      Patient has the following upcoming sleep appts:      Total time spent on accessing and interpreting remote patient PAP therapy data  10 minutes    Total time spent counseling, coaching  and reviewing PAP therapy data with patient  3 minutes    [id number]  [id number]  no (3 day STM)

## 2023-11-01 ENCOUNTER — VIRTUAL VISIT (OUTPATIENT)
Dept: SLEEP MEDICINE | Facility: CLINIC | Age: 58
End: 2023-11-01
Payer: COMMERCIAL

## 2023-11-01 VITALS — WEIGHT: 200 LBS | HEIGHT: 69 IN | BODY MASS INDEX: 29.62 KG/M2

## 2023-11-01 DIAGNOSIS — G47.33 OSA (OBSTRUCTIVE SLEEP APNEA): Primary | ICD-10-CM

## 2023-11-01 PROCEDURE — 99213 OFFICE O/P EST LOW 20 MIN: CPT | Mod: 95 | Performed by: PSYCHIATRY & NEUROLOGY

## 2023-11-01 ASSESSMENT — SLEEP AND FATIGUE QUESTIONNAIRES
HOW LIKELY ARE YOU TO NOD OFF OR FALL ASLEEP WHILE SITTING AND TALKING TO SOMEONE: WOULD NEVER DOZE
HOW LIKELY ARE YOU TO NOD OFF OR FALL ASLEEP WHILE LYING DOWN TO REST IN THE AFTERNOON WHEN CIRCUMSTANCES PERMIT: HIGH CHANCE OF DOZING
HOW LIKELY ARE YOU TO NOD OFF OR FALL ASLEEP WHILE SITTING QUIETLY AFTER LUNCH WITHOUT ALCOHOL: WOULD NEVER DOZE
HOW LIKELY ARE YOU TO NOD OFF OR FALL ASLEEP WHILE SITTING INACTIVE IN A PUBLIC PLACE: WOULD NEVER DOZE
HOW LIKELY ARE YOU TO NOD OFF OR FALL ASLEEP IN A CAR, WHILE STOPPED FOR A FEW MINUTES IN TRAFFIC: WOULD NEVER DOZE
HOW LIKELY ARE YOU TO NOD OFF OR FALL ASLEEP WHILE WATCHING TV: WOULD NEVER DOZE
HOW LIKELY ARE YOU TO NOD OFF OR FALL ASLEEP WHEN YOU ARE A PASSENGER IN A CAR FOR AN HOUR WITHOUT A BREAK: WOULD NEVER DOZE
HOW LIKELY ARE YOU TO NOD OFF OR FALL ASLEEP WHILE SITTING AND READING: WOULD NEVER DOZE

## 2023-11-01 ASSESSMENT — PAIN SCALES - GENERAL: PAINLEVEL: NO PAIN (0)

## 2023-11-01 NOTE — PROGRESS NOTES
Virtual Visit Details    Type of service:  Video Visit     Originating Location (pt. Location): Home    Distant Location (provider location):  On-site  Platform used for Video Visit: Dakota

## 2023-11-01 NOTE — NURSING NOTE
Is the patient currently in the state of [place]? YES    Visit mode:VIDEO    If the visit is dropped, the patient can be reconnected by: VIDEO VISIT: Text to cell phone:   Telephone Information:   Mobile [phone]       Will anyone else be joining the visit? NO  (If patient encounters technical issues they should call [phone]    How would you like to obtain your AVS? MyChart    Are changes needed to the allergy or medication list? No    Reason for visit: RECHECK    Seng ANGELO

## 2023-11-01 NOTE — PROGRESS NOTES
[facility]  Patient Name: Ernesto Soliman  MRN: [id number]  : 1965  Date of Clinic Visit: 2023  Primary Care Provider: Andrews Whiteside    FOLLOW-UP FOR: Mild obstructive sleep apnea, started CPAP  VISIT TYPE: Video via [employer]  VISIT START: 10:40 AM  VISIT END: 11:05 AM    INTERVAL HISTORY:  Ernesto Soliman is a 58 year old male with history of CAD s/p PCI, HLD presenting for follow-up for mild obstructive sleep apnea. He started using CPAP in 2023.    Most recent data: compliance 85%, AHI 0.92    Ernesto reports that he is doing really well since starting CPAP. No longer snoring. Feels more awake, no longer so drowsy or groggy in the morning. Denies sleepiness/fatigue with driving. No concerns with using the machine. Reports he uses it every night, usually at least until 05:00 but sometimes wears it until he wakes up around 07:00.    Goes to bed around 22:00. Falls asleep within 10-15 minutes.  Wakes up once overnight between midnight and 02:00 (uses bathroom), denies problems falling back asleep.  Wakes up around 07:00. Feels better rested, more alert compared to prior to CPAP.  Denies taking naps.  Caffeine: 3 cups of coffee daily + 1 cup decaf. Last caffeinated coffee between 10:00 and 11:00    Retired two years ago. Doing some part-time work to stay active now. Plays doubles tennis 3 mornings per week.    Assessment Scales 23 - (23)  NAS - 7  ESS - 3 (4)    ASSESSMENT AND PLAN:  Ernesto Soliman is a 58 year old male with history of CAD s/p PCI and HLD who presents for follow-up of mild obstructive sleep apnea (AHI 5.5 in 2023) who started using CPAP in 2023.    #Obstructive sleep apnea, mild, on CPAP  - subjectively and objectively Mr. Soliman is doing really well with CPAP  - continue using CPAP  - do not drive when tired or sleepy    Return to clinic in one year.     Patient was seen and discussed with Dr. Lerner.    Ramana Ignacio MD  Neurology Resident  PGY-3    Outpatient Sleep Medicine Staff  I have seen and evaluated the patient and discussed with the sleep fellow on 11-1-23.  I supervised the fellow during the visit and agree with the documentation.      In addition to face to face time I have also reviewed the patients chart, coordinated care, assisted in placing orders and documentation.  Total time (Face-to-Face, care coordination, orders, documentation) spent on 11-1-23 was 25 minutes.     Braulio Lerner MD      PHYSICAL EXAMINATION:  Vitals: No vitals were obtained for this virtual visit  Body mass index is 29.53 kg/m .  General: Conversational, not in acute distress  Psych: cooperative, appropriate mood and affect  Neuro: alert, no facial asymmetry, normal speech, normal language comprehension and expression, hearing intact to conversation    INVESTIGATIONS:  Most recent data available in chart:        Sleep Study 8/10/23:  Additional findings from the sleep study include AHI 5.5, REM AHI 14.8, supine AHI 5.5, RDI 8.0, and 0.6 minutes with oxygen saturation less than 88%.     REVIEW OF SYSTEMS: 12-point RoS negative except as per HPI.    MEDICATIONS:  Current Outpatient Medications   Medication Sig Dispense Refill    aspirin (ASA) 81 MG tablet Take 1 tablet (81 mg) by mouth daily      atorvastatin (LIPITOR) 40 MG tablet Take 0.5 tablets (20 mg) by mouth daily 45 tablet 3    co-enzyme Q-10 100 MG CAPS capsule Take 200 mg by mouth daily      scopolamine (TRANSDERM) 1 MG/3DAYS 72 hr patch Place 1 patch onto the skin every 72 hours 4 patch 0    zinc gluconate 50 MG tablet Take 50 mg by mouth daily      zolpidem (AMBIEN) 10 MG tablet Take 1 tablet (10 mg) by mouth nightly as needed for sleep (take night of sleep) 1 tablet 0

## 2023-11-15 NOTE — NURSING NOTE
Return in about 1 year reminder created and to be send via [employer].       Steven Moreno KARLIE  [facility]

## 2024-04-09 ENCOUNTER — TELEPHONE (OUTPATIENT)
Dept: FAMILY MEDICINE | Facility: CLINIC | Age: 59
End: 2024-04-09
Payer: COMMERCIAL

## 2024-04-09 NOTE — TELEPHONE ENCOUNTER
Pt calling stating he is in [place] right now - he has been having pain in his right lower abdomen - he notices the pain when he sneezes, or when he is breathing in deeply     He able to do all other movements with no pain     Denies: fevers, chills, nausea, vomiting     He has his appendix still     RN advised that since he is in another country that RN Is limited to what can be advised.     Pt stated he understood and he plans to go to the ER if things worsen but he would like to get an appointment for when he gets back     RN did make pt an appointment and strongly advised pt that he should be seen sooner if the symptoms worsen in the ER there     Patient stated an understanding and agreed with plan.    Laura Luna RN, BSN  [facility]

## 2024-04-12 ENCOUNTER — OFFICE VISIT (OUTPATIENT)
Dept: FAMILY MEDICINE | Facility: CLINIC | Age: 59
End: 2024-04-12
Payer: COMMERCIAL

## 2024-04-12 ENCOUNTER — TELEPHONE (OUTPATIENT)
Dept: FAMILY MEDICINE | Facility: CLINIC | Age: 59
End: 2024-04-12

## 2024-04-12 VITALS
DIASTOLIC BLOOD PRESSURE: 80 MMHG | TEMPERATURE: 97.3 F | RESPIRATION RATE: 18 BRPM | SYSTOLIC BLOOD PRESSURE: 118 MMHG | HEART RATE: 65 BPM | WEIGHT: 218 LBS | BODY MASS INDEX: 32.29 KG/M2 | OXYGEN SATURATION: 97 % | HEIGHT: 69 IN

## 2024-04-12 DIAGNOSIS — R10.31 RLQ ABDOMINAL PAIN: Primary | ICD-10-CM

## 2024-04-12 LAB
ALBUMIN SERPL BCG-MCNC: 4.8 G/DL (ref 3.5–5.2)
ALP SERPL-CCNC: 74 U/L (ref 40–150)
ALT SERPL W P-5'-P-CCNC: 50 U/L (ref 0–70)
ANION GAP SERPL CALCULATED.3IONS-SCNC: 12 MMOL/L (ref 7–15)
AST SERPL W P-5'-P-CCNC: 39 U/L (ref 0–45)
BASOPHILS # BLD AUTO: 0 10E3/UL (ref 0–0.2)
BASOPHILS NFR BLD AUTO: 0 %
BILIRUB SERPL-MCNC: 0.4 MG/DL
BUN SERPL-MCNC: 15.1 MG/DL (ref 8–23)
CALCIUM SERPL-MCNC: 9.9 MG/DL (ref 8.8–10.2)
CHLORIDE SERPL-SCNC: 100 MMOL/L (ref 98–107)
CREAT SERPL-MCNC: 0.88 MG/DL (ref 0.67–1.17)
CRP SERPL-MCNC: 3.75 MG/L
DEPRECATED HCO3 PLAS-SCNC: 26 MMOL/L (ref 22–29)
EGFRCR SERPLBLD CKD-EPI 2021: >90 ML/MIN/1.73M2
EOSINOPHIL # BLD AUTO: 0.3 10E3/UL (ref 0–0.7)
EOSINOPHIL NFR BLD AUTO: 5 %
ERYTHROCYTE [DISTWIDTH] IN BLOOD BY AUTOMATED COUNT: 13 % (ref 10–15)
GLUCOSE SERPL-MCNC: 96 MG/DL (ref 70–99)
HCT VFR BLD AUTO: 47.7 % (ref 40–53)
HGB BLD-MCNC: 16.4 G/DL (ref 13.3–17.7)
IMM GRANULOCYTES # BLD: 0 10E3/UL
IMM GRANULOCYTES NFR BLD: 0 %
LYMPHOCYTES # BLD AUTO: 2.9 10E3/UL (ref 0.8–5.3)
LYMPHOCYTES NFR BLD AUTO: 40 %
MCH RBC QN AUTO: 29 PG (ref 26.5–33)
MCHC RBC AUTO-ENTMCNC: 34.4 G/DL (ref 31.5–36.5)
MCV RBC AUTO: 84 FL (ref 78–100)
MONOCYTES # BLD AUTO: 0.5 10E3/UL (ref 0–1.3)
MONOCYTES NFR BLD AUTO: 7 %
NEUTROPHILS # BLD AUTO: 3.5 10E3/UL (ref 1.6–8.3)
NEUTROPHILS NFR BLD AUTO: 48 %
PLATELET # BLD AUTO: 189 10E3/UL (ref 150–450)
POTASSIUM SERPL-SCNC: 5.4 MMOL/L (ref 3.4–5.3)
PROT SERPL-MCNC: 7.7 G/DL (ref 6.4–8.3)
RBC # BLD AUTO: 5.66 10E6/UL (ref 4.4–5.9)
SODIUM SERPL-SCNC: 138 MMOL/L (ref 135–145)
WBC # BLD AUTO: 7.4 10E3/UL (ref 4–11)

## 2024-04-12 PROCEDURE — 86140 C-REACTIVE PROTEIN: CPT | Performed by: FAMILY MEDICINE

## 2024-04-12 PROCEDURE — 85025 COMPLETE CBC W/AUTO DIFF WBC: CPT | Performed by: FAMILY MEDICINE

## 2024-04-12 PROCEDURE — 36415 COLL VENOUS BLD VENIPUNCTURE: CPT | Performed by: FAMILY MEDICINE

## 2024-04-12 PROCEDURE — 99214 OFFICE O/P EST MOD 30 MIN: CPT | Performed by: FAMILY MEDICINE

## 2024-04-12 PROCEDURE — 80053 COMPREHEN METABOLIC PANEL: CPT | Performed by: FAMILY MEDICINE

## 2024-04-12 NOTE — PROGRESS NOTES
"  Assessment & Plan     RLQ abdominal pain  Unclear etiology but reassuring that pain has improved. No RLQ tenderness, rebound, guarding. Vitals stable. May be related to stool, gas, appendix. Could also represent musculoskeletal pain. Will check labs to look for signs of infection/inflammation. Consider US if indicated.   - Comprehensive metabolic panel (BMP + Alb, Alk Phos, ALT, AST, Total. Bili, TP); Future  - CRP, inflammation; Future  - CBC with platelets and differential; Future  - Comprehensive metabolic panel (BMP + Alb, Alk Phos, ALT, AST, Total. Bili, TP)  - CRP, inflammation  - CBC with platelets and differential      BMI  Estimated body mass index is 32.19 kg/m  as calculated from the following:    Height as of this encounter: 1.753 m (5' 9\").    Weight as of this encounter: 98.9 kg (218 lb).       Eleanor Orozco is a 58 year old, presenting for the following health issues:  Musculoskeletal Problem        4/12/2024     9:08 AM   Additional Questions   Roomed by Flavia KONG     History of Present Illness       Reason for visit:  Appendix (Pain started x 6 days ago while in [place].)  Symptom onset:  3-7 days ago    He eats 0-1 servings of fruits and vegetables daily.He consumes 1 sweetened beverage(s) daily.He exercises with enough effort to increase his heart rate 20 to 29 minutes per day.  He exercises with enough effort to increase his heart rate 3 or less days per week. He is missing 1 dose(s) of medications per week.     He just got back from [place] last night.    He arrived in [place] last Saturday and noticed a pain in his RLQ on Sunday. If he pushed on it, felt some pain which worsened on Monday. It then stabilized in  intensity throughout the week. If he pushed on his abdomen, took a deep breath, or sneezed, pain was worse. MOvement didn't aggravate the symptoms. Denied any umbilical pain or radiation of pain. He did have some bloating and also nausea. No fevers, chills constipation, hematochezia. " "Appetite remained normal and didn't drink any alcohol.      Has been off Statin for 1 month was having muscle pain- feels better off of it.         Review of Systems  Constitutional, HEENT, cardiovascular, pulmonary, gi and gu systems are negative, except as otherwise noted.      Objective    /80   Pulse 65   Temp 97.3  F (36.3  C)   Resp 18   Ht 1.753 m (5' 9\")   Wt 98.9 kg (218 lb)   SpO2 97%   BMI 32.19 kg/m    Body mass index is 32.19 kg/m .  Physical Exam   GENERAL: alert and no distress  RESP: lungs clear to auscultation - no rales, rhonchi or wheezes  ABDOMEN: soft, nontender, without hepatosplenomegaly or masses  Negative heel drop test.          Signed Electronically by: Stevo Poon DO  "

## 2024-04-12 NOTE — TELEPHONE ENCOUNTER
Patient calls for lab results    Was told her would get a call by noon with results and hasn't heard back.     Advised CBC was all within normal range.    CRP and CMP still in process.     Patient flying out to [place] now.    Routing to provider to review and advise once labs are back.     Germaine Small RN  [facility]

## 2024-04-12 NOTE — LETTER
April 12, 2024      Ernesto Soliman  [address] [facility] [address]        Dear ,    We are writing to inform you of your test results.    -Normal red blood cell (hgb) levels, normal white blood cell count and normal platelet levels. No need to pursue further imaging at this point.     Please contact me if you have any questions.       Resulted Orders   CBC with platelets and differential   Result Value Ref Range    WBC Count 7.4 4.0 - 11.0 10e3/uL    RBC Count 5.66 4.40 - 5.90 10e6/uL    Hemoglobin 16.4 13.3 - 17.7 g/dL    Hematocrit 47.7 40.0 - 53.0 %    MCV 84 78 - 100 fL    MCH 29.0 26.5 - 33.0 pg    MCHC 34.4 31.5 - 36.5 g/dL    RDW 13.0 10.0 - 15.0 %    Platelet Count 189 150 - 450 10e3/uL    % Neutrophils 48 %    % Lymphocytes 40 %    % Monocytes 7 %    % Eosinophils 5 %    % Basophils 0 %    % Immature Granulocytes 0 %    Absolute Neutrophils 3.5 1.6 - 8.3 10e3/uL    Absolute Lymphocytes 2.9 0.8 - 5.3 10e3/uL    Absolute Monocytes 0.5 0.0 - 1.3 10e3/uL    Absolute Eosinophils 0.3 0.0 - 0.7 10e3/uL    Absolute Basophils 0.0 0.0 - 0.2 10e3/uL    Absolute Immature Granulocytes 0.0 <=0.4 10e3/uL       If you have any questions or concerns, please call the clinic at the number listed above.       Sincerely,        Stevo Poon DO

## 2024-04-15 ENCOUNTER — DOCUMENTATION ONLY (OUTPATIENT)
Dept: SLEEP MEDICINE | Facility: CLINIC | Age: 59
End: 2024-04-15
Payer: COMMERCIAL

## 2024-04-15 NOTE — PROGRESS NOTES
Patient came to [place] for mask fitting appointment on April 15, 2024. Patient requested to switch masks because general discomfort . Discussed the following masks: Airfit P10, Dreamwear and Nuance Pro. Patient selected a [employer] Mask name: Dreamwear Pillow mask size Standard.    LET PT KNOW HE WILL NEED TO BREATH THROUGH HIS NOSE ONLY AND CAN ADD A CHINSTRAP OR CPAP TAPE IF HE IS STRUGGLING W/ THIS.  TOLD HIM PILLOWS ARE HARD TO GET USED TO AS THE AIR FEELS MORE AGGRESSIVE AND THE PILLOW CAN RUB CAUSEING NASAL SORENESS.  PT WILL CHECK ON [email or web address] FOR THE MASK AND TRY IT.  TOLD PT WE CAN'T GET A PA AS HE IS NOT USING THE MACHINE COMPLIANTLY.  HE WILL GO ON LINE TO GET A DIFFERENT MASK AND START USING HIS MACHINE EVERY NIGHT AGAIN.

## 2024-09-22 ENCOUNTER — HOSPITAL ENCOUNTER (EMERGENCY)
Facility: CLINIC | Age: 59
Discharge: HOME | End: 2024-09-22
Attending: EMERGENCY MEDICINE | Admitting: EMERGENCY MEDICINE
Payer: COMMERCIAL

## 2024-09-22 ENCOUNTER — APPOINTMENT (OUTPATIENT)
Dept: CT IMAGING | Facility: CLINIC | Age: 59
End: 2024-09-22
Attending: EMERGENCY MEDICINE
Payer: COMMERCIAL

## 2024-09-22 VITALS
RESPIRATION RATE: 22 BRPM | OXYGEN SATURATION: 96 % | WEIGHT: 201 LBS | DIASTOLIC BLOOD PRESSURE: 91 MMHG | SYSTOLIC BLOOD PRESSURE: 136 MMHG | TEMPERATURE: 98 F | BODY MASS INDEX: 29.68 KG/M2 | HEART RATE: 107 BPM

## 2024-09-22 DIAGNOSIS — R10.11 RUQ ABDOMINAL PAIN: ICD-10-CM

## 2024-09-22 DIAGNOSIS — R93.5 ABNORMAL CT OF THE ABDOMEN: ICD-10-CM

## 2024-09-22 LAB
ALBUMIN SERPL BCG-MCNC: 4.6 G/DL (ref 3.5–5.2)
ALBUMIN UR-MCNC: NEGATIVE MG/DL
ALP SERPL-CCNC: 130 U/L (ref 40–150)
ALT SERPL W P-5'-P-CCNC: 24 U/L (ref 0–70)
ANION GAP SERPL CALCULATED.3IONS-SCNC: 17 MMOL/L (ref 7–15)
APPEARANCE UR: CLEAR
AST SERPL W P-5'-P-CCNC: 45 U/L (ref 0–45)
BASOPHILS # BLD AUTO: 0.1 10E3/UL (ref 0–0.2)
BASOPHILS NFR BLD AUTO: 0 %
BILIRUB SERPL-MCNC: 0.5 MG/DL
BILIRUB UR QL STRIP: NEGATIVE
BUN SERPL-MCNC: 23.4 MG/DL (ref 8–23)
CALCIUM SERPL-MCNC: 9.7 MG/DL (ref 8.8–10.4)
CHLORIDE SERPL-SCNC: 98 MMOL/L (ref 98–107)
COLOR UR AUTO: ABNORMAL
CREAT SERPL-MCNC: 1.06 MG/DL (ref 0.67–1.17)
D DIMER PPP FEU-MCNC: 1.69 UG/ML FEU (ref 0–0.5)
EGFRCR SERPLBLD CKD-EPI 2021: 80 ML/MIN/1.73M2
EOSINOPHIL # BLD AUTO: 0.2 10E3/UL (ref 0–0.7)
EOSINOPHIL NFR BLD AUTO: 1 %
ERYTHROCYTE [DISTWIDTH] IN BLOOD BY AUTOMATED COUNT: 12.7 % (ref 10–15)
GLUCOSE SERPL-MCNC: 118 MG/DL (ref 70–99)
GLUCOSE UR STRIP-MCNC: NEGATIVE MG/DL
HCO3 SERPL-SCNC: 21 MMOL/L (ref 22–29)
HCT VFR BLD AUTO: 50.2 % (ref 40–53)
HGB BLD-MCNC: 17.3 G/DL (ref 13.3–17.7)
HGB UR QL STRIP: NEGATIVE
IMM GRANULOCYTES # BLD: 0.1 10E3/UL
IMM GRANULOCYTES NFR BLD: 1 %
KETONES UR STRIP-MCNC: 20 MG/DL
LACTATE SERPL-SCNC: 1.5 MMOL/L (ref 0.7–2)
LEUKOCYTE ESTERASE UR QL STRIP: NEGATIVE
LYMPHOCYTES # BLD AUTO: 3 10E3/UL (ref 0.8–5.3)
LYMPHOCYTES NFR BLD AUTO: 21 %
MCH RBC QN AUTO: 28.5 PG (ref 26.5–33)
MCHC RBC AUTO-ENTMCNC: 34.5 G/DL (ref 31.5–36.5)
MCV RBC AUTO: 83 FL (ref 78–100)
MONOCYTES # BLD AUTO: 0.8 10E3/UL (ref 0–1.3)
MONOCYTES NFR BLD AUTO: 6 %
MUCOUS THREADS #/AREA URNS LPF: PRESENT /LPF
NEUTROPHILS # BLD AUTO: 9.8 10E3/UL (ref 1.6–8.3)
NEUTROPHILS NFR BLD AUTO: 70 %
NITRATE UR QL: NEGATIVE
NRBC # BLD AUTO: 0 10E3/UL
NRBC BLD AUTO-RTO: 0 /100
PH UR STRIP: 5.5 [PH] (ref 5–7)
PLATELET # BLD AUTO: 250 10E3/UL (ref 150–450)
POTASSIUM SERPL-SCNC: 4.2 MMOL/L (ref 3.4–5.3)
PROT SERPL-MCNC: 7.8 G/DL (ref 6.4–8.3)
RBC # BLD AUTO: 6.07 10E6/UL (ref 4.4–5.9)
RBC URINE: <1 /HPF
SODIUM SERPL-SCNC: 136 MMOL/L (ref 135–145)
SP GR UR STRIP: 1.01 (ref 1–1.03)
TROPONIN T SERPL HS-MCNC: 18 NG/L
UROBILINOGEN UR STRIP-MCNC: NORMAL MG/DL
WBC # BLD AUTO: 13.9 10E3/UL (ref 4–11)
WBC URINE: <1 /HPF

## 2024-09-22 PROCEDURE — 258N000003 HC RX IP 258 OP 636: Performed by: EMERGENCY MEDICINE

## 2024-09-22 PROCEDURE — 96374 THER/PROPH/DIAG INJ IV PUSH: CPT | Mod: 59

## 2024-09-22 PROCEDURE — 85379 FIBRIN DEGRADATION QUANT: CPT | Performed by: EMERGENCY MEDICINE

## 2024-09-22 PROCEDURE — 84484 ASSAY OF TROPONIN QUANT: CPT | Performed by: EMERGENCY MEDICINE

## 2024-09-22 PROCEDURE — 80053 COMPREHEN METABOLIC PANEL: CPT | Performed by: EMERGENCY MEDICINE

## 2024-09-22 PROCEDURE — 71275 CT ANGIOGRAPHY CHEST: CPT

## 2024-09-22 PROCEDURE — 93005 ELECTROCARDIOGRAM TRACING: CPT

## 2024-09-22 PROCEDURE — 250N000011 HC RX IP 250 OP 636: Performed by: EMERGENCY MEDICINE

## 2024-09-22 PROCEDURE — 99291 CRITICAL CARE FIRST HOUR: CPT | Mod: 25

## 2024-09-22 PROCEDURE — 250N000009 HC RX 250: Performed by: EMERGENCY MEDICINE

## 2024-09-22 PROCEDURE — 85025 COMPLETE CBC W/AUTO DIFF WBC: CPT | Performed by: EMERGENCY MEDICINE

## 2024-09-22 PROCEDURE — 96375 TX/PRO/DX INJ NEW DRUG ADDON: CPT

## 2024-09-22 PROCEDURE — 96376 TX/PRO/DX INJ SAME DRUG ADON: CPT

## 2024-09-22 PROCEDURE — 83605 ASSAY OF LACTIC ACID: CPT | Performed by: EMERGENCY MEDICINE

## 2024-09-22 PROCEDURE — 81001 URINALYSIS AUTO W/SCOPE: CPT | Performed by: EMERGENCY MEDICINE

## 2024-09-22 PROCEDURE — 96361 HYDRATE IV INFUSION ADD-ON: CPT

## 2024-09-22 PROCEDURE — 36415 COLL VENOUS BLD VENIPUNCTURE: CPT | Performed by: EMERGENCY MEDICINE

## 2024-09-22 RX ORDER — HYDROMORPHONE HYDROCHLORIDE 1 MG/ML
0.5 INJECTION, SOLUTION INTRAMUSCULAR; INTRAVENOUS; SUBCUTANEOUS EVERY 30 MIN PRN
Status: COMPLETED | OUTPATIENT
Start: 2024-09-22 | End: 2024-09-22

## 2024-09-22 RX ORDER — IOPAMIDOL 755 MG/ML
500 INJECTION, SOLUTION INTRAVASCULAR ONCE
Status: COMPLETED | OUTPATIENT
Start: 2024-09-22 | End: 2024-09-22

## 2024-09-22 RX ORDER — OXYCODONE HYDROCHLORIDE 5 MG/1
5 TABLET ORAL EVERY 6 HOURS PRN
Qty: 12 TABLET | Refills: 0 | Status: SHIPPED | OUTPATIENT
Start: 2024-09-22 | End: 2024-09-25

## 2024-09-22 RX ORDER — ONDANSETRON 2 MG/ML
4 INJECTION INTRAMUSCULAR; INTRAVENOUS EVERY 30 MIN PRN
Status: DISCONTINUED | OUTPATIENT
Start: 2024-09-22 | End: 2024-09-23 | Stop reason: HOSPADM

## 2024-09-22 RX ADMIN — ONDANSETRON 4 MG: 2 INJECTION INTRAMUSCULAR; INTRAVENOUS at 22:56

## 2024-09-22 RX ADMIN — SODIUM CHLORIDE 1000 ML: 9 INJECTION, SOLUTION INTRAVENOUS at 20:54

## 2024-09-22 RX ADMIN — ONDANSETRON 4 MG: 2 INJECTION INTRAMUSCULAR; INTRAVENOUS at 20:48

## 2024-09-22 RX ADMIN — HYDROMORPHONE HYDROCHLORIDE 0.5 MG: 1 INJECTION, SOLUTION INTRAMUSCULAR; INTRAVENOUS; SUBCUTANEOUS at 22:58

## 2024-09-22 RX ADMIN — IOPAMIDOL 100 ML: 755 INJECTION, SOLUTION INTRAVENOUS at 21:52

## 2024-09-22 RX ADMIN — HYDROMORPHONE HYDROCHLORIDE 0.5 MG: 1 INJECTION, SOLUTION INTRAMUSCULAR; INTRAVENOUS; SUBCUTANEOUS at 20:53

## 2024-09-22 RX ADMIN — HYDROMORPHONE HYDROCHLORIDE 0.5 MG: 1 INJECTION, SOLUTION INTRAMUSCULAR; INTRAVENOUS; SUBCUTANEOUS at 21:42

## 2024-09-22 RX ADMIN — SODIUM CHLORIDE 86 ML: 9 INJECTION, SOLUTION INTRAVENOUS at 21:52

## 2024-09-22 ASSESSMENT — COLUMBIA-SUICIDE SEVERITY RATING SCALE - C-SSRS
6. HAVE YOU EVER DONE ANYTHING, STARTED TO DO ANYTHING, OR PREPARED TO DO ANYTHING TO END YOUR LIFE?: NO
1. IN THE PAST MONTH, HAVE YOU WISHED YOU WERE DEAD OR WISHED YOU COULD GO TO SLEEP AND NOT WAKE UP?: NO
2. HAVE YOU ACTUALLY HAD ANY THOUGHTS OF KILLING YOURSELF IN THE PAST MONTH?: NO

## 2024-09-22 ASSESSMENT — ACTIVITIES OF DAILY LIVING (ADL)
ADLS_ACUITY_SCORE: 35

## 2024-09-23 ENCOUNTER — TRANSFERRED RECORDS (OUTPATIENT)
Dept: HEALTH INFORMATION MANAGEMENT | Facility: CLINIC | Age: 59
End: 2024-09-23
Payer: COMMERCIAL

## 2024-09-23 ENCOUNTER — PATIENT OUTREACH (OUTPATIENT)
Dept: FAMILY MEDICINE | Facility: CLINIC | Age: 59
End: 2024-09-23
Payer: COMMERCIAL

## 2024-09-23 LAB
ATRIAL RATE - MUSE: 91 BPM
DIASTOLIC BLOOD PRESSURE - MUSE: NORMAL MMHG
INTERPRETATION ECG - MUSE: NORMAL
P AXIS - MUSE: 61 DEGREES
PR INTERVAL - MUSE: 152 MS
QRS DURATION - MUSE: 86 MS
QT - MUSE: 356 MS
QTC - MUSE: 437 MS
R AXIS - MUSE: 2 DEGREES
SYSTOLIC BLOOD PRESSURE - MUSE: NORMAL MMHG
T AXIS - MUSE: 24 DEGREES
VENTRICULAR RATE- MUSE: 91 BPM

## 2024-09-23 NOTE — ED PROVIDER NOTES
Emergency Department Note      History of Present Illness     Chief Complaint   Abdominal Pain (Seen a few months ago for same told his sulaiman. Was inflamed then but not bad enough for surgery)    CYNDI Soliman is a 59 year old male with a history of CAD and hyperlipidemia who presents with a female  to the Emergency Department for abdominal pain. The patient reports he first felt this abdominal pain 6 months ago and he went to [place] where he was told he had an inflamed appendix, but not bad enough for surgery. A week ago, the patient was in [place] when the pain came back. He says it has been intermittent since and is exacerbated when laying down. He says the pain is so severe, he has become nauseous and it is difficult to breathe. He also endorses feeling feverish. He denies blood in the urine, pain in his testicles, or history of abdominal surgeries.    Independent Historian   Wife as detailed above.    Past Medical History     Medical History and Problem List   ADD  CAD  Hyperlipidemia     Medications   Atorvastatin   Amphetamine-Dextroamphetamine   Albuterol HFA     Surgical History   Vasectomy  REMBERTO x2 mid LAD, jailed diagonal    Physical Exam     Patient Vitals for the past 24 hrs:   BP Temp Temp src Pulse Resp SpO2 Weight   09/22/24 2252 -- -- -- 107 -- -- --   09/22/24 2247 (!) 136/91 -- -- 100 -- 96 % --   09/22/24 2242 -- -- -- 99 -- 96 % --   09/22/24 2237 -- -- -- 97 -- 94 % --   09/22/24 2232 122/78 -- -- 93 -- 94 % --   09/22/24 2230 -- -- -- 99 -- 94 % --   09/22/24 2215 121/73 -- -- 94 -- 94 % --   09/22/24 2145 -- -- -- 94 -- 91 % --   09/22/24 2130 (!) 146/88 -- -- 92 -- 92 % --   09/22/24 2115 139/80 -- -- 90 -- 96 % --   09/22/24 2044 -- 98  F (36.7  C) Oral -- -- -- --   09/22/24 2025 (!) 144/84 -- -- (!) 130 22 97 % 91.2 kg (201 lb)     Physical Exam  VS: Reviewed per above  HENT: Mucous membranes moist  EYES: sclera anicteric  CV: Rate as noted, regular rhythm.   RESP: Effort  normal. Breath sounds are normal bilaterally.  GI: moderate right flank tenderness without rebound/guarding, not distended.  NEURO: Alert, moving all extremities  MSK: No deformity of the extremities  SKIN: Warm and dry    Diagnostics     Lab Results   Labs Ordered and Resulted from Time of ED Arrival to Time of ED Departure   COMPREHENSIVE METABOLIC PANEL - Abnormal       Result Value    Sodium 136      Potassium 4.2      Carbon Dioxide (CO2) 21 (*)     Anion Gap 17 (*)     Urea Nitrogen 23.4 (*)     Creatinine 1.06      GFR Estimate 80      Calcium 9.7      Chloride 98      Glucose 118 (*)     Alkaline Phosphatase 130      AST 45      ALT 24      Protein Total 7.8      Albumin 4.6      Bilirubin Total 0.5     D DIMER QUANTITATIVE - Abnormal    D-Dimer Quantitative 1.69 (*)    ROUTINE UA WITH MICROSCOPIC REFLEX TO CULTURE - Abnormal    Color Urine Light Yellow      Appearance Urine Clear      Glucose Urine Negative      Bilirubin Urine Negative      Ketones Urine 20 (*)     Specific Gravity Urine 1.010      Blood Urine Negative      pH Urine 5.5      Protein Albumin Urine Negative      Urobilinogen Urine Normal      Nitrite Urine Negative      Leukocyte Esterase Urine Negative      Mucus Urine Present (*)     RBC Urine <1      WBC Urine <1     CBC WITH PLATELETS AND DIFFERENTIAL - Abnormal    WBC Count 13.9 (*)     RBC Count 6.07 (*)     Hemoglobin 17.3      Hematocrit 50.2      MCV 83      MCH 28.5      MCHC 34.5      RDW 12.7      Platelet Count 250      % Neutrophils 70      % Lymphocytes 21      % Monocytes 6      % Eosinophils 1      % Basophils 0      % Immature Granulocytes 1      NRBCs per 100 WBC 0      Absolute Neutrophils 9.8 (*)     Absolute Lymphocytes 3.0      Absolute Monocytes 0.8      Absolute Eosinophils 0.2      Absolute Basophils 0.1      Absolute Immature Granulocytes 0.1      Absolute NRBCs 0.0     TROPONIN T, HIGH SENSITIVITY - Normal    Troponin T, High Sensitivity 18     LACTIC ACID WHOLE  BLOOD - Normal    Lactic Acid 1.5     TROPONIN T, HIGH SENSITIVITY     Imaging   CT Chest (PE) Abdomen Pelvis w Contrast   Final Result   IMPRESSION:   1.  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 Metastatic lymphadenopathy in the mandie hepatis, portacaval station, and right epicardial fat. Favored diagnostic considerations include hepatocellular    carcinoma and gallbladder carcinoma.   2.  Small right pleural effusion.   3.  No pulmonary embolus.      I discussed the findings with Dr. Fan of the ED at 10:30 PM on 9/22/2024.        EKG   ECG results from 09/22/24   EKG 12-lead, tracing only     Value    Systolic Blood Pressure     Diastolic Blood Pressure     Ventricular Rate 91    Atrial Rate 91    MD Interval 152    QRS Duration 86        QTc 437    P Axis 61    R AXIS 2    T Axis 24    Interpretation ECG      Sinus rhythm  Inferior infarct , age undetermined  Abnormal ECG  No significant change when compared to 5/1/2019  Interpretation done by me at 2043       ED Course      Medications Administered   Medications   ondansetron (ZOFRAN) injection 4 mg (4 mg Intravenous $Given 9/22/24 2256)   sodium chloride 0.9% BOLUS 1,000 mL (0 mLs Intravenous Stopped 9/22/24 2213)   HYDROmorphone (PF) (DILAUDID) injection 0.5 mg (0.5 mg Intravenous $Given 9/22/24 2258)   iopamidol (ISOVUE-370) solution 500 mL (100 mLs Intravenous $Given 9/22/24 2152)   CT Scan Flush (86 mLs Intravenous $Given 9/22/24 2152)     Procedures   None    Discussion of Management   RadiologistBraulio MD    ED Course   ED Course as of 09/22/24 2335   Sun Sep 22, 2024   2036 I evaluated the patient, obtained history, and performed a physical exam as detailed above.    2235 I spoke with radiologist Braulio Peterson MD.         Medical Decision Making / Diagnosis     CMS Diagnoses: None    MIPS     CT for PE was ordered because the patient had an abnormal d-dimer.    NAZARIO FUENTES  "Jourdan is a 59 year old male who presents to the ER for evaluation of right-sided abdominal pain progressive over the past week but intermittently over some time.  Vital signs reassuring.  On exam he has moderate right-sided abdominal tenderness.  CT chest and abdomen unfortunately shows evidence of malignancy within the gallbladder and liver.  There is also small right pleural effusion.  No other obvious cause of pain was identified on CT imaging.  Labs without evidence of UTI or myocardial ischemia or signs of severe sepsis.  Pain improved with medication here.  Discussed findings with patient and significant other and plans for referral to \"fast pass\" program with [place] [facility].  Prescription of oxycodone sent electronically to pharmacy of choice to help with pain in the interim.  Return precautions discussed.    Disposition   The patient was discharged.     Diagnosis     ICD-10-CM    1. RUQ abdominal pain  R10.11       2. Abnormal CT of the abdomen  R93.5     concern for liver/gallbaldder cancer           Discharge Medications   Discharge Medication List as of 9/22/2024 11:00 PM        START taking these medications    Details   oxyCODONE (ROXICODONE) 5 MG tablet Take 1 tablet (5 mg) by mouth every 6 hours as needed for breakthrough pain., Disp-12 tablet, R-0, E-Prescribe           Scribe Disclosure:  I, Andria Fishman, am serving as a scribe at 9:19 PM on 9/22/2024 to document services personally performed by Tyler Fan MD based on my observations and the provider's statements to me.        Tyler Fan MD  09/22/24 [id number]    "

## 2024-09-23 NOTE — ED TRIAGE NOTES
Comes to ED for evaluation of RLQ abdominal pain, seen for same several months ago, told inflamed but not bad enough for surgery yet.  Today pain 10/10 +nausea.     Triage Assessment (Adult)       Row Name 09/22/24 2025          Triage Assessment    Airway WDL WDL        Respiratory WDL    Respiratory WDL WDL        Skin Circulation/Temperature WDL    Skin Circulation/Temperature WDL WDL        Cardiac WDL    Cardiac WDL WDL        Peripheral/Neurovascular WDL    Peripheral Neurovascular WDL WDL        Cognitive/Neuro/Behavioral WDL    Cognitive/Neuro/Behavioral WDL WDL

## 2024-10-01 ENCOUNTER — TRANSFERRED RECORDS (OUTPATIENT)
Dept: HEALTH INFORMATION MANAGEMENT | Facility: CLINIC | Age: 59
End: 2024-10-01

## 2024-10-01 ENCOUNTER — LAB (OUTPATIENT)
Dept: LAB | Facility: CLINIC | Age: 59
End: 2024-10-01
Payer: COMMERCIAL

## 2024-10-01 DIAGNOSIS — Z01.812 PRE-OPERATIVE LABORATORY EXAMINATION: Primary | ICD-10-CM

## 2024-10-01 LAB — INR PPP: 1.1 (ref 0.85–1.15)

## 2024-10-01 PROCEDURE — 85610 PROTHROMBIN TIME: CPT

## 2024-10-01 PROCEDURE — 36415 COLL VENOUS BLD VENIPUNCTURE: CPT

## 2024-10-09 ENCOUNTER — TRANSFERRED RECORDS (OUTPATIENT)
Dept: HEALTH INFORMATION MANAGEMENT | Facility: CLINIC | Age: 59
End: 2024-10-09
Payer: COMMERCIAL

## 2024-10-15 PROBLEM — C23: Status: ACTIVE | Noted: 2024-09-01

## 2024-10-18 ENCOUNTER — TRANSFERRED RECORDS (OUTPATIENT)
Dept: HEALTH INFORMATION MANAGEMENT | Facility: CLINIC | Age: 59
End: 2024-10-18
Payer: COMMERCIAL

## 2024-10-28 ENCOUNTER — TRANSFERRED RECORDS (OUTPATIENT)
Dept: HEALTH INFORMATION MANAGEMENT | Facility: CLINIC | Age: 59
End: 2024-10-28
Payer: COMMERCIAL

## 2024-11-11 ENCOUNTER — TRANSFERRED RECORDS (OUTPATIENT)
Dept: HEALTH INFORMATION MANAGEMENT | Facility: CLINIC | Age: 59
End: 2024-11-11
Payer: COMMERCIAL

## 2024-11-16 ENCOUNTER — HEALTH MAINTENANCE LETTER (OUTPATIENT)
Age: 59
End: 2024-11-16

## 2024-12-02 ENCOUNTER — TRANSFERRED RECORDS (OUTPATIENT)
Dept: HEALTH INFORMATION MANAGEMENT | Facility: CLINIC | Age: 59
End: 2024-12-02
Payer: COMMERCIAL

## 2024-12-19 ENCOUNTER — OFFICE VISIT (OUTPATIENT)
Dept: CARDIOLOGY | Facility: CLINIC | Age: 59
End: 2024-12-19
Payer: COMMERCIAL

## 2024-12-19 VITALS
HEIGHT: 69 IN | HEART RATE: 88 BPM | WEIGHT: 183.7 LBS | BODY MASS INDEX: 27.21 KG/M2 | OXYGEN SATURATION: 97 % | SYSTOLIC BLOOD PRESSURE: 124 MMHG | DIASTOLIC BLOOD PRESSURE: 72 MMHG

## 2024-12-19 DIAGNOSIS — E78.5 HYPERLIPIDEMIA LDL GOAL <70: Primary | ICD-10-CM

## 2024-12-19 DIAGNOSIS — I25.10 CORONARY ARTERY DISEASE INVOLVING NATIVE CORONARY ARTERY OF NATIVE HEART WITHOUT ANGINA PECTORIS: ICD-10-CM

## 2024-12-19 DIAGNOSIS — Z95.5 HISTORY OF PLACEMENT OF STENT IN LAD CORONARY ARTERY: ICD-10-CM

## 2024-12-19 NOTE — LETTER
12/19/2024    Andrews Whiteside MD  [address] [facility] [address]    RE: Ernesto Soliman       Dear Colleague,     I had the pleasure of seeing Ernesto Soliman in the [facility].  HPI and Plan:   It is my pleasure to see your patient Ernesto Soliman in consultation.  This is a 59-year-old patient who I saw approximately a decade ago when he had a markedly abnormal stress test and underwent coronary angiography and was found to have a severe stenosis in the mid left anterior descending artery and had stenting with drug-eluting stents in that area.  I have not seen him since that time but he has been seen by my partner Dr. Burnett and my former partner ELSIE Al up to and including 2021.  He was previously on aspirin and atorvastatin but developed marked joint discomfort with atorvastatin and stopped that medication.  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  He has metastases throughout his liver and also has regional lymph node metastases.  He has involvement of the gallbladder body and f fundus.  He has been treated with Keytruda and Gemzar cisplatin.    From a cardiac point of view he has no chest pains, no chest pressure and no unusual shortness of breath.  He did not have an EKG today because he felt it would make little difference to his prognosis or treatment at this particular time.  We did have a long discussion about the use of statin therapy to reduce cardiac event rates especially in somebody like him who was relatively young when he developed coronary artery disease requiring stents.  The pros of statin therapy is that they reduce cardiac events the cons are that he did have side effect with atorvastatin and there is a higher incidence that he would have side effects with other statins and also he has liver involvement with his cancer and so the statins might cause abnormal liver function testing.  I  also explained that if 1 has problems with 1 statin it does not mean that will have problems with other statins.  The water-soluble statin such as pravastatin and rosuvastatin tend to have a lower incidence of side effects.    Impression:  1.  Coronary artery disease and status post prior stenting of the left anterior descending artery in 2015.  The patient appears to be asymptomatic with respect to coronary artery disease.  He is still taking aspirin but not statin therapy.  2.  He is taking checkpoint inhibitors which can cause significant cardiac side effects including myocarditis in 1% of patients.  He should have at least a baseline echocardiogram to look at left ventricular systolic function on this medication.  3.  Stage IV gallbladder or cholangiocarcinoma.    Plan:  1.  I will order an echocardiogram which is important given that he is taking a checkpoint inhibitor and we do need a baseline for left ventricular systolic function.  2.  He will think about starting a low-dose of a water-soluble statin such as rosuvastatin 10 mg/day or pravastatin 20 mg/day.  He should continue his aspirin which she is doing.  3.  I will see the patient back again in [place] which is closer for him in about 12 weeks time but if we see any abnormalities on the echocardiogram I let him know.    As always the patient is been told to contact me if is any questions or any concerns.    Francis Cheney MD, FACC, FRCPI.        Orders Placed This Encounter   Procedures     Follow-Up with Cardiology     Echocardiogram Complete       No orders of the defined types were placed in this encounter.      There are no discontinued medications.      Encounter Diagnoses   Name Primary?     Hyperlipidemia LDL goal <70 Yes     Coronary artery disease involving native coronary artery of native heart without angina pectoris      History of placement of stent in LAD coronary artery        CURRENT MEDICATIONS:  Current Outpatient Medications    Medication Sig Dispense Refill     aspirin (ASA) 81 MG tablet Take 1 tablet (81 mg) by mouth daily       co-enzyme Q-10 100 MG CAPS capsule Take 200 mg by mouth daily       zinc gluconate 50 MG tablet Take 50 mg by mouth daily         ALLERGIES     Allergies   Allergen Reactions     Atorvastatin Muscle Pain (Myalgia)       PAST MEDICAL HISTORY:  Past Medical History:   Diagnosis Date     ADD (attention deficit hyperactivity disorder, inattentive type) 1997     CAD (coronary artery disease) 06/2015    REMBERTO x 2 = LAD  EF 35-40% - Dr Cheney     Colon polyp 04/2015    tubular adenoma - due 5 yrs     Controlled substance agreement signed 08/2016     Family history of coronary artery disease      Hyperlipidemia LDL goal <70      Medication management      Primary cancer of gallbladder with metastasis to other site (H) 09/2024    Dr Simpson     Snoring     [facility] Goltz       PAST SURGICAL HISTORY:  Past Surgical History:   Procedure Laterality Date     COLONOSCOPY  04/01/2015    polyp x 1 6 mm - rectum - tubular adenoma - due 5 yrs     COLONOSCOPY N/A 09/22/2021    polyp x 1 - inflammatory - due 5 yrs     HEART CATH STENT COR W/WO PTCA  06/24/2015    REMBERTO x2 mid LAD, jailed diagonal     STRESS ECHO ([facility])  06/01/2015    abnormal - followed by abnormal angiogram - LAD occlusion     VASECTOMY  11/01/2011    dr perez       FAMILY HISTORY:  Family History   Problem Relation Age of Onset     Chronic Obstructive Pulmonary Disease Mother         smoker     ALS Father         smoker     Cystic Fibrosis Son      C.A.D. Maternal Uncle         stents x 3     C.A.D. Paternal Uncle         MI at 56     C.A.D. Maternal Grandfather         age 56     C.A.D. Paternal Grandfather         age 60     Coronary Artery Disease Maternal Grandmother      Colon Cancer No family hx of        SOCIAL HISTORY:  Social History     Socioeconomic History     Marital status:      Spouse name: Nayely     Number of children: 3      "Years of education: 16     Highest education level: None   Occupational History     Employer: [employer]   Tobacco Use     Smoking status: Never     Smokeless tobacco: Never   Vaping Use     Vaping status: Never Used   Substance and Sexual Activity     Alcohol use: Not Currently     Alcohol/week: 0.0 - 1.0 standard drinks of alcohol     Drug use: No     Sexual activity: Yes     Partners: Female   Other Topics Concern     Caffeine Concern No     Comment: 2 cups daily, occas pop     Exercise Yes     Comment: 1-3/wk     Seat Belt Yes     Parent/sibling w/ CABG, MI or angioplasty before 65F 55M? No       Review of Systems:  Skin:          Eyes:         ENT:         Respiratory:  Negative       Cardiovascular:  Negative      Gastroenterology:        Genitourinary:         Musculoskeletal:         Neurologic:         Psychiatric:         Heme/Lymph/Imm:         Endocrine:           Physical Exam:  Vitals: /72   Pulse 88   Ht 1.753 m (5' 9\")   Wt 83.3 kg (183 lb 11.2 oz)   SpO2 97%   BMI 27.13 kg/m      Constitutional:  cooperative, alert and oriented, well developed, well nourished, in no acute distress        Skin:  warm and dry to the touch          Head:  no masses or lesions        Eyes:  pupils equal and round        Lymph:      ENT:  no pallor or cyanosis        Neck:  carotid pulses are full and equal bilaterally, JVP normal, no carotid bruit        Respiratory:  normal breath sounds, clear to auscultation, normal A-P diameter, normal symmetry, normal respiratory excursion, no use of accessory muscles         Cardiac: regular rhythm, normal S1/S2, no S3 or S4, apical impulse not displaced, no murmurs, gallops or rubs                pulses full and equal                                        GI:  not assessed this visit        Extremities and Muscular Skeletal:  no deformities, clubbing, cyanosis, erythema observed, no edema              Neurological:  no gross motor deficits, affect appropriate    "     Psych:  Alert and Oriented x 3        CC  Andrews Whiteside MD  [address] [place] [address]                  Thank you for allowing me to participate in the care of your patient.      Sincerely,     Francis Cheney MD, MD     [facility]  cc:   Andrews Whiteside MD  [address] [place] [address]

## 2024-12-19 NOTE — PROGRESS NOTES
HPI and Plan:   It is my pleasure to see your patient Ernesto Soliman in consultation.  This is a 59-year-old patient who I saw approximately a decade ago when he had a markedly abnormal stress test and underwent coronary angiography and was found to have a severe stenosis in the mid left anterior descending artery and had stenting with drug-eluting stents in that area.  I have not seen him since that time but he has been seen by my partner Dr. Burnett and my former partner ELSIE Al up to and including 2021.  He was previously on aspirin and atorvastatin but developed marked joint discomfort with atorvastatin and stopped that medication.  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  He has metastases throughout his liver and also has regional lymph node metastases.  He has involvement of the gallbladder body and f fundus.  He has been treated with Keytruda and Gemzar cisplatin.    From a cardiac point of view he has no chest pains, no chest pressure and no unusual shortness of breath.  He did not have an EKG today because he felt it would make little difference to his prognosis or treatment at this particular time.  We did have a long discussion about the use of statin therapy to reduce cardiac event rates especially in somebody like him who was relatively young when he developed coronary artery disease requiring stents.  The pros of statin therapy is that they reduce cardiac events the cons are that he did have side effect with atorvastatin and there is a higher incidence that he would have side effects with other statins and also he has liver involvement with his cancer and so the statins might cause abnormal liver function testing.  I also explained that if 1 has problems with 1 statin it does not mean that will have problems with other statins.  The water-soluble statin such as pravastatin and rosuvastatin tend to have a lower incidence of  side effects.    Impression:  1.  Coronary artery disease and status post prior stenting of the left anterior descending artery in 2015.  The patient appears to be asymptomatic with respect to coronary artery disease.  He is still taking aspirin but not statin therapy.  2.  He is taking checkpoint inhibitors which can cause significant cardiac side effects including myocarditis in 1% of patients.  He should have at least a baseline echocardiogram to look at left ventricular systolic function on this medication.  3.  Stage IV gallbladder or cholangiocarcinoma.    Plan:  1.  I will order an echocardiogram which is important given that he is taking a checkpoint inhibitor and we do need a baseline for left ventricular systolic function.  2.  He will think about starting a low-dose of a water-soluble statin such as rosuvastatin 10 mg/day or pravastatin 20 mg/day.  He should continue his aspirin which she is doing.  3.  I will see the patient back again in [place] which is closer for him in about 12 weeks time but if we see any abnormalities on the echocardiogram I let him know.    As always the patient is been told to contact me if is any questions or any concerns.    Francis Cheney MD, FACC, FRCPI.        Orders Placed This Encounter   Procedures    Follow-Up with Cardiology    Echocardiogram Complete       No orders of the defined types were placed in this encounter.      There are no discontinued medications.      Encounter Diagnoses   Name Primary?    Hyperlipidemia LDL goal <70 Yes    Coronary artery disease involving native coronary artery of native heart without angina pectoris     History of placement of stent in LAD coronary artery        CURRENT MEDICATIONS:  Current Outpatient Medications   Medication Sig Dispense Refill    aspirin (ASA) 81 MG tablet Take 1 tablet (81 mg) by mouth daily      co-enzyme Q-10 100 MG CAPS capsule Take 200 mg by mouth daily      zinc gluconate 50 MG tablet Take 50 mg by  mouth daily         ALLERGIES     Allergies   Allergen Reactions    Atorvastatin Muscle Pain (Myalgia)       PAST MEDICAL HISTORY:  Past Medical History:   Diagnosis Date    ADD (attention deficit hyperactivity disorder, inattentive type) 1997    CAD (coronary artery disease) 06/2015    REMBERTO x 2 = LAD  EF 35-40% - Dr Roy-Geovanny    Colon polyp 04/2015    tubular adenoma - due 5 yrs    Controlled substance agreement signed 08/2016    Family history of coronary artery disease     Hyperlipidemia LDL goal <70     Medication management     Primary cancer of gallbladder with metastasis to other site (H) 09/2024    Dr Simpson    Snoring     [facility] - Bennett Goltz       PAST SURGICAL HISTORY:  Past Surgical History:   Procedure Laterality Date    COLONOSCOPY  04/01/2015    polyp x 1 6 mm - rectum - tubular adenoma - due 5 yrs    COLONOSCOPY N/A 09/22/2021    polyp x 1 - inflammatory - due 5 yrs    HEART CATH STENT COR W/WO PTCA  06/24/2015    REMBERTO x2 mid LAD, jailed diagonal    STRESS ECHO ([facility])  06/01/2015    abnormal - followed by abnormal angiogram - LAD occlusion    VASECTOMY  11/01/2011    dr perez       FAMILY HISTORY:  Family History   Problem Relation Age of Onset    Chronic Obstructive Pulmonary Disease Mother         smoker    ALS Father         smoker    Cystic Fibrosis Son     C.A.D. Maternal Uncle         stents x 3    C.A.D. Paternal Uncle         MI at 56    C.A.D. Maternal Grandfather         age 56    C.A.D. Paternal Grandfather         age 60    Coronary Artery Disease Maternal Grandmother     Colon Cancer No family hx of        SOCIAL HISTORY:  Social History     Socioeconomic History    Marital status:      Spouse name: Nayely    Number of children: 3    Years of education: 16    Highest education level: None   Occupational History     Employer: [employer]   Tobacco Use    Smoking status: Never    Smokeless tobacco: Never   Vaping Use    Vaping status: Never Used   Substance and Sexual Activity  "   Alcohol use: Not Currently     Alcohol/week: 0.0 - 1.0 standard drinks of alcohol    Drug use: No    Sexual activity: Yes     Partners: Female   Other Topics Concern    Caffeine Concern No     Comment: 2 cups daily, occas pop    Exercise Yes     Comment: 1-3/wk    Seat Belt Yes    Parent/sibling w/ CABG, MI or angioplasty before 65F 55M? No       Review of Systems:  Skin:          Eyes:         ENT:         Respiratory:  Negative       Cardiovascular:  Negative      Gastroenterology:        Genitourinary:         Musculoskeletal:         Neurologic:         Psychiatric:         Heme/Lymph/Imm:         Endocrine:           Physical Exam:  Vitals: /72   Pulse 88   Ht 1.753 m (5' 9\")   Wt 83.3 kg (183 lb 11.2 oz)   SpO2 97%   BMI 27.13 kg/m      Constitutional:  cooperative, alert and oriented, well developed, well nourished, in no acute distress        Skin:  warm and dry to the touch          Head:  no masses or lesions        Eyes:  pupils equal and round        Lymph:      ENT:  no pallor or cyanosis        Neck:  carotid pulses are full and equal bilaterally, JVP normal, no carotid bruit        Respiratory:  normal breath sounds, clear to auscultation, normal A-P diameter, normal symmetry, normal respiratory excursion, no use of accessory muscles         Cardiac: regular rhythm, normal S1/S2, no S3 or S4, apical impulse not displaced, no murmurs, gallops or rubs                pulses full and equal                                        GI:  not assessed this visit        Extremities and Muscular Skeletal:  no deformities, clubbing, cyanosis, erythema observed, no edema              Neurological:  no gross motor deficits, affect appropriate        Psych:  Alert and Oriented x 3        CC  Andrews Whiteside MD  [address] [place] [id number]                "

## 2025-01-10 ENCOUNTER — HOSPITAL ENCOUNTER (OUTPATIENT)
Dept: CARDIOLOGY | Facility: CLINIC | Age: 60
Discharge: HOME | End: 2025-01-10
Attending: INTERNAL MEDICINE | Admitting: INTERNAL MEDICINE
Payer: COMMERCIAL

## 2025-01-10 DIAGNOSIS — E78.5 HYPERLIPIDEMIA LDL GOAL <70: ICD-10-CM

## 2025-01-10 DIAGNOSIS — Z95.5 HISTORY OF PLACEMENT OF STENT IN LAD CORONARY ARTERY: ICD-10-CM

## 2025-01-10 DIAGNOSIS — I25.10 CORONARY ARTERY DISEASE INVOLVING NATIVE CORONARY ARTERY OF NATIVE HEART WITHOUT ANGINA PECTORIS: ICD-10-CM

## 2025-01-10 LAB — LVEF ECHO: NORMAL

## 2025-01-10 PROCEDURE — 93306 TTE W/DOPPLER COMPLETE: CPT

## 2025-01-10 PROCEDURE — 93306 TTE W/DOPPLER COMPLETE: CPT | Mod: 26 | Performed by: INTERNAL MEDICINE

## 2025-01-13 ENCOUNTER — TRANSFERRED RECORDS (OUTPATIENT)
Dept: HEALTH INFORMATION MANAGEMENT | Facility: CLINIC | Age: 60
End: 2025-01-13
Payer: COMMERCIAL

## 2025-02-03 ENCOUNTER — TRANSFERRED RECORDS (OUTPATIENT)
Dept: HEALTH INFORMATION MANAGEMENT | Facility: CLINIC | Age: 60
End: 2025-02-03
Payer: COMMERCIAL

## 2025-02-06 ENCOUNTER — TELEPHONE (OUTPATIENT)
Dept: CARDIOLOGY | Facility: CLINIC | Age: 60
End: 2025-02-06

## 2025-02-06 NOTE — TELEPHONE ENCOUNTER
Reviewed echo showing   Left ventricular size, wall motion and function are normal. The ejection fraction is 60-65%.  Normal right ventricle size and systolic function.  Normal left atrial size.  No hemodynamically significant valvular abnormalities on 2D or color flow imaging.  There is no comparison study available.    Per office note dated 12/19/24, Dr. Cheney recommended:   1.  I will order an echocardiogram which is important given that he is taking a checkpoint inhibitor and we do need a baseline for left ventricular systolic function.  2.  He will think about starting a low-dose of a water-soluble statin such as rosuvastatin 10 mg/day or pravastatin 20 mg/day.  He should continue his aspirin which she is doing.  3.  I will see the patient back again in [place] which is closer for him in about 12 weeks time but if we see any abnormalities on the echocardiogram I let him know.    Will message Dr. Cheney to review. Sherry MINA

## 2025-04-03 NOTE — TELEPHONE ENCOUNTER
No evidence of checkpoint inhibitor cardiomyopathy.  Continue with present medications and follow-up as arranged.  Thanks

## 2025-04-08 NOTE — TELEPHONE ENCOUNTER
Called pt with echo results & recommendations from Dr. Cheney. Pt states he has been dealing with stage 4 liver cancer & getting chemotherapy. Pt states he has a scan tomorrow & will know more about what the plan is moving forward next week. Pt states he will call back Wednesday 4/16/25 w/ an update. Sherry MINA

## 2025-05-27 ENCOUNTER — TRANSFERRED RECORDS (OUTPATIENT)
Dept: HEALTH INFORMATION MANAGEMENT | Facility: CLINIC | Age: 60
End: 2025-05-27
Payer: COMMERCIAL

## 2025-07-14 ENCOUNTER — TRANSFERRED RECORDS (OUTPATIENT)
Dept: HEALTH INFORMATION MANAGEMENT | Facility: CLINIC | Age: 60
End: 2025-07-14
Payer: COMMERCIAL

## 2025-08-04 ENCOUNTER — TRANSFERRED RECORDS (OUTPATIENT)
Dept: HEALTH INFORMATION MANAGEMENT | Facility: CLINIC | Age: 60
End: 2025-08-04
Payer: COMMERCIAL

## (undated) DEVICE — ENDO FORCEP BX CAPTURA JUMBO SPIKE 2.8MMX230CM G53042

## (undated) DEVICE — KIT ENDO TURNOVER/PROCEDURE W/CLEAN A SCOPE LINERS 103888